# Patient Record
Sex: MALE | Race: WHITE | NOT HISPANIC OR LATINO | Employment: FULL TIME | URBAN - METROPOLITAN AREA
[De-identification: names, ages, dates, MRNs, and addresses within clinical notes are randomized per-mention and may not be internally consistent; named-entity substitution may affect disease eponyms.]

---

## 2017-01-11 ENCOUNTER — TRANSCRIBE ORDERS (OUTPATIENT)
Dept: ADMINISTRATIVE | Facility: HOSPITAL | Age: 32
End: 2017-01-11

## 2017-01-11 DIAGNOSIS — S83.231A COMPLEX TEAR OF MEDIAL MENISCUS OF RIGHT KNEE AS CURRENT INJURY, INITIAL ENCOUNTER: Primary | ICD-10-CM

## 2017-01-11 DIAGNOSIS — M25.561 RIGHT KNEE PAIN, UNSPECIFIED CHRONICITY: ICD-10-CM

## 2017-01-17 ENCOUNTER — TRANSCRIBE ORDERS (OUTPATIENT)
Dept: ADMINISTRATIVE | Facility: HOSPITAL | Age: 32
End: 2017-01-17

## 2017-01-17 ENCOUNTER — HOSPITAL ENCOUNTER (OUTPATIENT)
Dept: RADIOLOGY | Facility: HOSPITAL | Age: 32
Discharge: HOME | End: 2017-01-17
Payer: COMMERCIAL

## 2017-01-17 ENCOUNTER — HOSPITAL ENCOUNTER (OUTPATIENT)
Dept: MRI IMAGING | Facility: HOSPITAL | Age: 32
Discharge: HOME | End: 2017-01-17
Payer: COMMERCIAL

## 2017-01-17 DIAGNOSIS — M25.561 RIGHT KNEE PAIN, UNSPECIFIED CHRONICITY: ICD-10-CM

## 2017-01-17 DIAGNOSIS — M25.561 RIGHT KNEE PAIN, UNSPECIFIED CHRONICITY: Primary | ICD-10-CM

## 2017-01-17 DIAGNOSIS — S83.231A COMPLEX TEAR OF MEDIAL MENISCUS OF RIGHT KNEE AS CURRENT INJURY, INITIAL ENCOUNTER: ICD-10-CM

## 2017-01-17 PROCEDURE — 73721 MRI JNT OF LWR EXTRE W/O DYE: CPT

## 2017-01-17 PROCEDURE — 70030 X-RAY EYE FOR FOREIGN BODY: CPT

## 2017-01-26 ENCOUNTER — GENERIC CONVERSION - ENCOUNTER (OUTPATIENT)
Dept: OTHER | Facility: OTHER | Age: 32
End: 2017-01-26

## 2017-03-02 ENCOUNTER — GENERIC CONVERSION - ENCOUNTER (OUTPATIENT)
Dept: OTHER | Facility: OTHER | Age: 32
End: 2017-03-02

## 2017-05-25 ENCOUNTER — ALLSCRIPTS OFFICE VISIT (OUTPATIENT)
Dept: OTHER | Facility: OTHER | Age: 32
End: 2017-05-25

## 2017-07-19 ENCOUNTER — TRANSCRIBE ORDERS (OUTPATIENT)
Dept: ADMINISTRATIVE | Facility: HOSPITAL | Age: 32
End: 2017-07-19

## 2017-07-19 ENCOUNTER — ALLSCRIPTS OFFICE VISIT (OUTPATIENT)
Dept: OTHER | Facility: OTHER | Age: 32
End: 2017-07-19

## 2017-07-19 DIAGNOSIS — I10 ESSENTIAL (PRIMARY) HYPERTENSION: ICD-10-CM

## 2017-07-19 DIAGNOSIS — R05.9 COUGH: ICD-10-CM

## 2017-07-19 DIAGNOSIS — J98.59 MEDIASTINAL MASS: Primary | ICD-10-CM

## 2017-07-20 ENCOUNTER — GENERIC CONVERSION - ENCOUNTER (OUTPATIENT)
Dept: OTHER | Facility: OTHER | Age: 32
End: 2017-07-20

## 2017-07-25 ENCOUNTER — HOSPITAL ENCOUNTER (OUTPATIENT)
Dept: CT IMAGING | Facility: HOSPITAL | Age: 32
Discharge: HOME | End: 2017-07-25
Attending: INTERNAL MEDICINE
Payer: COMMERCIAL

## 2017-07-25 DIAGNOSIS — R05.9 COUGH: ICD-10-CM

## 2017-07-25 PROCEDURE — 71260 CT THORAX DX C+: CPT

## 2017-07-25 RX ADMIN — IOHEXOL 85 ML: 350 INJECTION, SOLUTION INTRAVENOUS at 17:48

## 2017-07-29 ENCOUNTER — APPOINTMENT (OUTPATIENT)
Dept: LAB | Facility: HOSPITAL | Age: 32
End: 2017-07-29
Attending: INTERNAL MEDICINE
Payer: COMMERCIAL

## 2017-07-29 ENCOUNTER — TRANSCRIBE ORDERS (OUTPATIENT)
Dept: ADMINISTRATIVE | Facility: HOSPITAL | Age: 32
End: 2017-07-29

## 2017-07-29 DIAGNOSIS — I10 ESSENTIAL HYPERTENSION, MALIGNANT: ICD-10-CM

## 2017-07-29 DIAGNOSIS — R59.0 BILATERAL HILAR ADENOPATHY SYNDROME: ICD-10-CM

## 2017-07-29 DIAGNOSIS — R59.0 BILATERAL HILAR ADENOPATHY SYNDROME: Primary | ICD-10-CM

## 2017-07-29 LAB
ALBUMIN SERPL BCP-MCNC: 3.8 G/DL (ref 3.5–5)
ALP SERPL-CCNC: 70 U/L (ref 46–116)
ALT SERPL W P-5'-P-CCNC: 50 U/L (ref 12–78)
ANION GAP SERPL CALCULATED.3IONS-SCNC: 10 MMOL/L (ref 4–13)
AST SERPL W P-5'-P-CCNC: 18 U/L (ref 5–45)
BASOPHILS # BLD AUTO: 0.01 THOUSANDS/ΜL (ref 0–0.1)
BASOPHILS NFR BLD AUTO: 0 % (ref 0–1)
BILIRUB SERPL-MCNC: 0.8 MG/DL (ref 0.2–1)
BUN SERPL-MCNC: 22 MG/DL (ref 5–25)
CALCIUM SERPL-MCNC: 8.9 MG/DL (ref 8.3–10.1)
CHLORIDE SERPL-SCNC: 102 MMOL/L (ref 100–108)
CHOLEST SERPL-MCNC: 181 MG/DL (ref 50–200)
CO2 SERPL-SCNC: 29 MMOL/L (ref 21–32)
CREAT SERPL-MCNC: 1.09 MG/DL (ref 0.6–1.3)
EOSINOPHIL # BLD AUTO: 0.13 THOUSAND/ΜL (ref 0–0.61)
EOSINOPHIL NFR BLD AUTO: 3 % (ref 0–6)
ERYTHROCYTE [DISTWIDTH] IN BLOOD BY AUTOMATED COUNT: 13.8 % (ref 11.6–15.1)
GFR SERPL CREATININE-BSD FRML MDRD: 89 ML/MIN/1.73SQ M
GLUCOSE P FAST SERPL-MCNC: 131 MG/DL (ref 65–99)
HCT VFR BLD AUTO: 48.6 % (ref 36.5–49.3)
HDLC SERPL-MCNC: 33 MG/DL (ref 40–60)
HGB BLD-MCNC: 16.4 G/DL (ref 12–17)
LDH SERPL-CCNC: 182 U/L (ref 81–234)
LDLC SERPL CALC-MCNC: 111 MG/DL (ref 0–100)
LYMPHOCYTES # BLD AUTO: 1.44 THOUSANDS/ΜL (ref 0.6–4.47)
LYMPHOCYTES NFR BLD AUTO: 30 % (ref 14–44)
MCH RBC QN AUTO: 27.1 PG (ref 26.8–34.3)
MCHC RBC AUTO-ENTMCNC: 33.7 G/DL (ref 31.4–37.4)
MCV RBC AUTO: 80 FL (ref 82–98)
MONOCYTES # BLD AUTO: 0.41 THOUSAND/ΜL (ref 0.17–1.22)
MONOCYTES NFR BLD AUTO: 9 % (ref 4–12)
NEUTROPHILS # BLD AUTO: 2.83 THOUSANDS/ΜL (ref 1.85–7.62)
NEUTS SEG NFR BLD AUTO: 58 % (ref 43–75)
PLATELET # BLD AUTO: 186 THOUSANDS/UL (ref 149–390)
PMV BLD AUTO: 10.7 FL (ref 8.9–12.7)
POTASSIUM SERPL-SCNC: 4.1 MMOL/L (ref 3.5–5.3)
PROT SERPL-MCNC: 7.2 G/DL (ref 6.4–8.2)
RBC # BLD AUTO: 6.06 MILLION/UL (ref 3.88–5.62)
SODIUM SERPL-SCNC: 141 MMOL/L (ref 136–145)
TRIGL SERPL-MCNC: 184 MG/DL
TSH SERPL DL<=0.05 MIU/L-ACNC: 1.35 UIU/ML (ref 0.36–3.74)
WBC # BLD AUTO: 4.82 THOUSAND/UL (ref 4.31–10.16)

## 2017-07-29 PROCEDURE — 83615 LACTATE (LD) (LDH) ENZYME: CPT

## 2017-07-29 PROCEDURE — 82164 ANGIOTENSIN I ENZYME TEST: CPT

## 2017-07-29 PROCEDURE — 84443 ASSAY THYROID STIM HORMONE: CPT

## 2017-07-29 PROCEDURE — 80053 COMPREHEN METABOLIC PANEL: CPT

## 2017-07-29 PROCEDURE — 80061 LIPID PANEL: CPT

## 2017-07-29 PROCEDURE — 85025 COMPLETE CBC W/AUTO DIFF WBC: CPT

## 2017-07-29 PROCEDURE — 36415 COLL VENOUS BLD VENIPUNCTURE: CPT

## 2017-07-31 LAB — ACE SERPL-CCNC: 33 U/L (ref 14–82)

## 2017-08-02 ENCOUNTER — GENERIC CONVERSION - ENCOUNTER (OUTPATIENT)
Dept: OTHER | Facility: OTHER | Age: 32
End: 2017-08-02

## 2017-08-09 ENCOUNTER — ALLSCRIPTS OFFICE VISIT (OUTPATIENT)
Dept: OTHER | Facility: OTHER | Age: 32
End: 2017-08-09

## 2017-08-23 ENCOUNTER — ANESTHESIA EVENT (OUTPATIENT)
Dept: PERIOP | Facility: HOSPITAL | Age: 32
End: 2017-08-23
Payer: COMMERCIAL

## 2017-08-23 ENCOUNTER — ANESTHESIA (OUTPATIENT)
Dept: PERIOP | Facility: HOSPITAL | Age: 32
End: 2017-08-23
Payer: COMMERCIAL

## 2017-08-23 ENCOUNTER — HOSPITAL ENCOUNTER (OUTPATIENT)
Facility: HOSPITAL | Age: 32
Setting detail: OUTPATIENT SURGERY
Discharge: HOME | End: 2017-08-23
Attending: INTERNAL MEDICINE | Admitting: INTERNAL MEDICINE
Payer: COMMERCIAL

## 2017-08-23 VITALS
WEIGHT: 225 LBS | TEMPERATURE: 97.8 F | SYSTOLIC BLOOD PRESSURE: 134 MMHG | HEIGHT: 74 IN | HEART RATE: 70 BPM | RESPIRATION RATE: 16 BRPM | OXYGEN SATURATION: 95 % | DIASTOLIC BLOOD PRESSURE: 68 MMHG | BODY MASS INDEX: 28.88 KG/M2

## 2017-08-23 DIAGNOSIS — R93.89 ABNORMAL CHEST CT: ICD-10-CM

## 2017-08-23 PROCEDURE — 88305 TISSUE EXAM BY PATHOLOGIST: CPT | Performed by: INTERNAL MEDICINE

## 2017-08-23 PROCEDURE — 87102 FUNGUS ISOLATION CULTURE: CPT | Performed by: INTERNAL MEDICINE

## 2017-08-23 PROCEDURE — 88173 CYTOPATH EVAL FNA REPORT: CPT | Performed by: INTERNAL MEDICINE

## 2017-08-23 PROCEDURE — 87205 SMEAR GRAM STAIN: CPT | Performed by: INTERNAL MEDICINE

## 2017-08-23 PROCEDURE — 88112 CYTOPATH CELL ENHANCE TECH: CPT | Performed by: INTERNAL MEDICINE

## 2017-08-23 PROCEDURE — 88342 IMHCHEM/IMCYTCHM 1ST ANTB: CPT | Performed by: INTERNAL MEDICINE

## 2017-08-23 PROCEDURE — 87176 TISSUE HOMOGENIZATION CULTR: CPT | Performed by: INTERNAL MEDICINE

## 2017-08-23 PROCEDURE — 87107 FUNGI IDENTIFICATION MOLD: CPT | Performed by: INTERNAL MEDICINE

## 2017-08-23 PROCEDURE — 88312 SPECIAL STAINS GROUP 1: CPT | Performed by: INTERNAL MEDICINE

## 2017-08-23 PROCEDURE — 87070 CULTURE OTHR SPECIMN AEROBIC: CPT | Performed by: INTERNAL MEDICINE

## 2017-08-23 PROCEDURE — 88184 FLOWCYTOMETRY/ TC 1 MARKER: CPT | Performed by: INTERNAL MEDICINE

## 2017-08-23 PROCEDURE — 87116 MYCOBACTERIA CULTURE: CPT | Performed by: INTERNAL MEDICINE

## 2017-08-23 PROCEDURE — 88172 CYTP DX EVAL FNA 1ST EA SITE: CPT | Performed by: INTERNAL MEDICINE

## 2017-08-23 PROCEDURE — 88185 FLOWCYTOMETRY/TC ADD-ON: CPT

## 2017-08-23 PROCEDURE — 87206 SMEAR FLUORESCENT/ACID STAI: CPT | Performed by: INTERNAL MEDICINE

## 2017-08-23 RX ORDER — ONDANSETRON 2 MG/ML
INJECTION INTRAMUSCULAR; INTRAVENOUS AS NEEDED
Status: DISCONTINUED | OUTPATIENT
Start: 2017-08-23 | End: 2017-08-23 | Stop reason: SURG

## 2017-08-23 RX ORDER — SODIUM CHLORIDE 9 MG/ML
125 INJECTION, SOLUTION INTRAVENOUS CONTINUOUS
Status: DISCONTINUED | OUTPATIENT
Start: 2017-08-23 | End: 2017-08-23 | Stop reason: HOSPADM

## 2017-08-23 RX ORDER — LIDOCAINE HYDROCHLORIDE 10 MG/ML
INJECTION, SOLUTION EPIDURAL; INFILTRATION; INTRACAUDAL; PERINEURAL
Status: DISCONTINUED
Start: 2017-08-23 | End: 2017-08-23 | Stop reason: HOSPADM

## 2017-08-23 RX ORDER — PROPOFOL 10 MG/ML
INJECTION, EMULSION INTRAVENOUS AS NEEDED
Status: DISCONTINUED | OUTPATIENT
Start: 2017-08-23 | End: 2017-08-23 | Stop reason: SURG

## 2017-08-23 RX ORDER — FENTANYL CITRATE/PF 50 MCG/ML
25 SYRINGE (ML) INJECTION
Status: DISCONTINUED | OUTPATIENT
Start: 2017-08-23 | End: 2017-08-23 | Stop reason: HOSPADM

## 2017-08-23 RX ORDER — ONDANSETRON 2 MG/ML
4 INJECTION INTRAMUSCULAR; INTRAVENOUS ONCE AS NEEDED
Status: DISCONTINUED | OUTPATIENT
Start: 2017-08-23 | End: 2017-08-23 | Stop reason: HOSPADM

## 2017-08-23 RX ADMIN — PROPOFOL 50 MG: 10 INJECTION, EMULSION INTRAVENOUS at 13:39

## 2017-08-23 RX ADMIN — PROPOFOL 200 MG: 10 INJECTION, EMULSION INTRAVENOUS at 13:35

## 2017-08-23 RX ADMIN — SODIUM CHLORIDE: 0.9 INJECTION, SOLUTION INTRAVENOUS at 13:30

## 2017-08-23 RX ADMIN — ONDANSETRON 4 MG: 2 INJECTION INTRAMUSCULAR; INTRAVENOUS at 14:31

## 2017-08-25 LAB — SCAN RESULT: NORMAL

## 2017-08-26 LAB
BACTERIA BRONCH AEROBE CULT: NORMAL
BACTERIA TISS AEROBE CULT: NORMAL
GRAM STN SPEC: NORMAL
GRAM STN SPEC: NORMAL

## 2017-08-30 ENCOUNTER — GENERIC CONVERSION - ENCOUNTER (OUTPATIENT)
Dept: OTHER | Facility: OTHER | Age: 32
End: 2017-08-30

## 2017-09-02 ENCOUNTER — GENERIC CONVERSION - ENCOUNTER (OUTPATIENT)
Dept: OTHER | Facility: OTHER | Age: 32
End: 2017-09-02

## 2017-09-02 ENCOUNTER — APPOINTMENT (OUTPATIENT)
Dept: LAB | Facility: HOSPITAL | Age: 32
End: 2017-09-02
Attending: INTERNAL MEDICINE
Payer: COMMERCIAL

## 2017-09-02 ENCOUNTER — TRANSCRIBE ORDERS (OUTPATIENT)
Dept: ADMINISTRATIVE | Facility: HOSPITAL | Age: 32
End: 2017-09-02

## 2017-09-02 DIAGNOSIS — I10 ESSENTIAL (PRIMARY) HYPERTENSION: ICD-10-CM

## 2017-09-02 LAB
ANION GAP SERPL CALCULATED.3IONS-SCNC: 6 MMOL/L (ref 4–13)
BUN SERPL-MCNC: 25 MG/DL (ref 5–25)
CALCIUM SERPL-MCNC: 9.3 MG/DL (ref 8.3–10.1)
CHLORIDE SERPL-SCNC: 103 MMOL/L (ref 100–108)
CO2 SERPL-SCNC: 29 MMOL/L (ref 21–32)
CREAT SERPL-MCNC: 1.13 MG/DL (ref 0.6–1.3)
EST. AVERAGE GLUCOSE BLD GHB EST-MCNC: 146 MG/DL
GFR SERPL CREATININE-BSD FRML MDRD: 85 ML/MIN/1.73SQ M
GLUCOSE P FAST SERPL-MCNC: 129 MG/DL (ref 65–99)
HBA1C MFR BLD: 6.7 % (ref 4.2–6.3)
POTASSIUM SERPL-SCNC: 4.9 MMOL/L (ref 3.5–5.3)
SODIUM SERPL-SCNC: 138 MMOL/L (ref 136–145)

## 2017-09-02 PROCEDURE — 36415 COLL VENOUS BLD VENIPUNCTURE: CPT

## 2017-09-02 PROCEDURE — 83036 HEMOGLOBIN GLYCOSYLATED A1C: CPT

## 2017-09-02 PROCEDURE — 80048 BASIC METABOLIC PNL TOTAL CA: CPT

## 2017-09-19 ENCOUNTER — ALLSCRIPTS OFFICE VISIT (OUTPATIENT)
Dept: OTHER | Facility: OTHER | Age: 32
End: 2017-09-19

## 2017-09-25 LAB — FUNGUS SPEC CULT: NORMAL

## 2017-09-26 ENCOUNTER — TRANSCRIBE ORDERS (OUTPATIENT)
Dept: ADMINISTRATIVE | Facility: HOSPITAL | Age: 32
End: 2017-09-26

## 2017-09-26 ENCOUNTER — ALLSCRIPTS OFFICE VISIT (OUTPATIENT)
Dept: OTHER | Facility: OTHER | Age: 32
End: 2017-09-26

## 2017-09-26 DIAGNOSIS — D86.0 PULMONARY SARCOIDOSIS (HCC): Primary | ICD-10-CM

## 2017-10-10 LAB — LABORATORY COMMENT REPORT: NORMAL

## 2017-10-11 LAB
MYCOBACTERIUM SPEC CULT: NORMAL
MYCOBACTERIUM SPEC CULT: NORMAL
RHODAMINE-AURAMINE STN SPEC: NORMAL
RHODAMINE-AURAMINE STN SPEC: NORMAL

## 2017-10-24 ENCOUNTER — GENERIC CONVERSION - ENCOUNTER (OUTPATIENT)
Dept: OTHER | Facility: OTHER | Age: 32
End: 2017-10-24

## 2017-10-25 ENCOUNTER — GENERIC CONVERSION - ENCOUNTER (OUTPATIENT)
Dept: OTHER | Facility: OTHER | Age: 32
End: 2017-10-25

## 2017-11-07 ENCOUNTER — GENERIC CONVERSION - ENCOUNTER (OUTPATIENT)
Dept: OTHER | Facility: OTHER | Age: 32
End: 2017-11-07

## 2017-11-07 ENCOUNTER — CONVERSION ENCOUNTER (OUTPATIENT)
Dept: OTHER | Facility: OTHER | Age: 32
End: 2017-11-07

## 2017-11-07 LAB
FUNGUS SPEC CULT: ABNORMAL
LEFT EYE DIABETIC RETINOPATHY: NORMAL
RIGHT EYE DIABETIC RETINOPATHY: NORMAL

## 2017-11-08 ENCOUNTER — GENERIC CONVERSION - ENCOUNTER (OUTPATIENT)
Dept: OTHER | Facility: OTHER | Age: 32
End: 2017-11-08

## 2017-12-01 DIAGNOSIS — D86.0 SARCOIDOSIS OF LUNG (HCC): ICD-10-CM

## 2017-12-04 ENCOUNTER — HOSPITAL ENCOUNTER (OUTPATIENT)
Dept: PULMONOLOGY | Facility: HOSPITAL | Age: 32
Discharge: HOME | End: 2017-12-04
Attending: INTERNAL MEDICINE
Payer: COMMERCIAL

## 2017-12-04 DIAGNOSIS — D86.0 PULMONARY SARCOIDOSIS (HCC): ICD-10-CM

## 2017-12-04 DIAGNOSIS — E11.9 TYPE 2 DIABETES MELLITUS WITHOUT COMPLICATIONS (HCC): ICD-10-CM

## 2017-12-05 ENCOUNTER — HOSPITAL ENCOUNTER (OUTPATIENT)
Dept: PULMONOLOGY | Facility: HOSPITAL | Age: 32
Discharge: HOME | End: 2017-12-05
Attending: INTERNAL MEDICINE
Payer: COMMERCIAL

## 2017-12-05 ENCOUNTER — GENERIC CONVERSION - ENCOUNTER (OUTPATIENT)
Dept: PULMONOLOGY | Facility: HOSPITAL | Age: 32
End: 2017-12-05

## 2017-12-05 ENCOUNTER — HOSPITAL ENCOUNTER (OUTPATIENT)
Dept: CT IMAGING | Facility: HOSPITAL | Age: 32
Discharge: HOME | End: 2017-12-05
Attending: INTERNAL MEDICINE
Payer: COMMERCIAL

## 2017-12-05 DIAGNOSIS — D86.0 SARCOIDOSIS OF LUNG (HCC): ICD-10-CM

## 2017-12-05 PROCEDURE — 94060 EVALUATION OF WHEEZING: CPT

## 2017-12-05 PROCEDURE — 94726 PLETHYSMOGRAPHY LUNG VOLUMES: CPT

## 2017-12-05 PROCEDURE — 94729 DIFFUSING CAPACITY: CPT

## 2017-12-05 PROCEDURE — 94760 N-INVAS EAR/PLS OXIMETRY 1: CPT

## 2017-12-05 PROCEDURE — 71260 CT THORAX DX C+: CPT

## 2017-12-05 RX ORDER — ALBUTEROL SULFATE 2.5 MG/3ML
2.5 SOLUTION RESPIRATORY (INHALATION) EVERY 6 HOURS PRN
Status: DISCONTINUED | OUTPATIENT
Start: 2017-12-05 | End: 2017-12-09 | Stop reason: HOSPADM

## 2017-12-05 RX ADMIN — IOHEXOL 85 ML: 350 INJECTION, SOLUTION INTRAVENOUS at 19:32

## 2017-12-08 ENCOUNTER — ALLSCRIPTS OFFICE VISIT (OUTPATIENT)
Dept: OTHER | Facility: OTHER | Age: 32
End: 2017-12-08

## 2017-12-09 NOTE — PROGRESS NOTES
Assessment    1  Sarcoidosis of lung (135,517 8) (D86 0)    Plan  Sarcoidosis of lung    · Follow-up visit in 1 year Evaluation and Treatment  Follow-up  Status: Hold For -Scheduling  Requested for: [id number]   Ordered; Sarcoidosis of lung; Ordered By: Betty Conway Performed:  Due: 06BDS7582    Results/Data  PFT Results v2:     Spirometry: Forced vital capacity: 6 25L-- and-- 102% Predicted Values  Forced expiratory volume in one second: 4 72L-- and-- 96% Predicted Value  FEV1/FVC ratio is 75  Post Bronchodilator Spirometry:   Lung Volumes: Total lung capacity : 10 49L-- and-- 135% Predicted Values  RV: 215% Predicted Values  DLCO:  DLCO 109% Predicted Values  PFT Interpretation:  December 5, 2017: Normal spirometry, lung capacity and diffusion capacity  Elevation in residual volume may represent air trapping  CT CHEST W CONTRAST 22XLN3685 05:51PM Betty Conway     TW Order Number: [id number]  Performing Comments: [facility] DEC 5TH  700PM  NO PREP   - Patient Instructions: To schedule this appointment, please contact Central Scheduling at [phone]  Test Name Result Flag Reference   CT CHEST W CONTRAST (Report)       CT CHEST WITH IV CONTRAST   INDICATION: D86 0: Sarcoidosis of lung (this clinical history is taken directly from the electronic ordering system)  COMPARISON: July 25, 2017  TECHNIQUE: CT examination of the chest was performed  Reformatted images were created in axial, sagittal, and coronal planes  Radiation dose length product (DLP) for this visit: 355 mGy-cm   This examination, like all CT scans performed in the [facility], was performed utilizing techniques to minimize radiation dose exposure, including the use of iterative   reconstruction and automated exposure control  IV Contrast: 85 mL of iohexol (OMNIPAQUE)      FINDINGS:   LUNGS: Previously noted patchy area of groundglass density in the left suprahilar lung has resolved   Lungs are clear  No CT evidence of interstitial lung disease  PLEURA: Unremarkable  HEART/GREAT VESSELS: Unremarkable for patient's age  MEDIASTINUM AND MELLY: Marked interval decrease in the size of mediastinal and hilar lymph nodes  Borderline and minimally enlarged residual nodes are identified and specifically there is a 14 x 11 mm lower right hilar node on image 34 of series 2,   decreased from 1 9 x 1 8 cm on the previous examination  A subcarinal node measuring 1 5 x 1 5 cm is decreased from 3 9 x 2 2 cm on prior examination  CHEST WALL AND LOWER NECK: Unremarkable  VISUALIZED STRUCTURES IN THE UPPER ABDOMEN: Spleen no longer appears enlarged  No other clinically significant abnormality identified within the visualized upper abdomen  OSSEOUS STRUCTURES: No acute fracture  No destructive osseous lesion  IMPRESSION:   Marked interval decrease in lymphadenopathy since previous examination  Interval resolution of patchy area of groundglass parenchymal density previously noted in the suprahilar left lung  Spleen no longer appears enlarged  Workstation performed: [id number]   Signed by:  Lydia Canela MD  12/6/17     (1) HEMOGLOBIN A1C 07Sej0626 08:16AM [employer] Order Number: [de-identified]     Test Name Result Flag Reference   HEMOGLOBIN A1C 6 7 % H 4 2-6 3   EST  AVG   GLUCOSE 146 mg/dl       (1) TISSUE EXAM [id number] 02:50PM Gila Pac     Test Name Result Flag Reference   LAB AP CASE REPORT (Report)       Surgical Pathology Report             Case: [id number]                  Authorizing Provider: Cain Atkinson DO     Collected:      08/23/2017 1450       Ordering Location:   [facility]    Received:      08/23/2017 [facility]                           Pathologist:      Megan Damon MD                               Specimens:  A) - Lung, Left second mick endobronchial biopsy                          B) - Lung, Main mick endobronchial biopsy   LAB AP FINAL DIAGNOSIS (Report)       A  Lung, Left second mick endobronchial biopsy: -Benign respiratory mucosa with mild acute & chronic inflammation and few  non- necrotizing granulomas  -No fungal organisms seen on PAS stain  -No mycobacterial organisms identified on special stains (AFB)  Controls  reacted appropriately -No evidence of vasculitis, necrosis malignancy seen  Comment The differential diagnosis is vast and includes among others infectious  disease (e g , mycobacterial fungal, other), hypersensitivity pneumonitis,  vasculitis syndromes, local Henri granulomatosis and sarcoidosis  Clinical correlation and further investigations (e g  angiotensin  converting enzyme (ACE), ANCA, renal function tests) is recommended  B  Lung, Main mick endobronchial biopsy: -Benign respiratory mucosa with mild chronic inflammation and thickened  smooth muscle fragments -No evidence of malignancy seen  -Multiple deeper levels examined  Electronically signed by Herminia Gill MD on 8/28/2017 at 1:35 PM   LAB AP NOTE (Report)       -Pankeratin stain MCK is used in evaluation and highlighted surface  bronchial epithelium  Stromal cells are negative for staining  Appropriate  positive and negative controls reacted appropriately Intradepartmental consultation is in agreement Interpretation performed at [facility], [facility] A copy of this report was faxed to Dr Carla Sheridan  [facility] office on8/28/2017 at  1:30 PM   LAB AP SURGICAL ADDITIONAL INFORMATION (Report)       All controls performed with the immunohistochemical stains reported above  reacted appropriately  These tests were developed and their performance  characteristics determined by [facility] or  [facility]  They may not be cleared or approved by the [place]  Food and Drug Administration  The FDA has determined that such clearance  or approval is not necessary   These tests are used for clinical purposes  They should not be regarded as investigational or for research  This  laboratory has been approved by [place] 88, designated as a high-complexity  laboratory and is qualified to perform these tests  LAB AP GROSS DESCRIPTION (Report)       A  The specimen is received in formalin, labeled with the patient's name  and hospital number, and is designated left second mick endobronchial  biopsy  The specimen consists of multiple tan red soft tissue and  hemorrhagic fragments measuring in aggregate 0 3 x 0 2 x 0 1 cm  Entirely  submitted  One cassette  B  The specimen is received in formalin, labeled with the patient's name  and hospital number, and is designated main mick endobronchial  biopsy  The specimen consists of multiple tan red soft tissue and  hemorrhagic fragments measuring in aggregate 0 3 x 0 3 x 0 1 cm  Entirely  submitted  One cassette  Note: The estimated total formalin fixation time based upon information  provided by the submitting clinician and the standard processing schedule  is 23 25 hours  MAC       Discussion/Summary  Discussion Summary: At this point the patient is doing quite well  CT scan and clinical symptoms have significantly improved  does not warrant any therapy for sarcoidosis at this time  has gotten a flu shot this yeardo not believe that he needs a pneumovax  I will see him back in 1 year or sooner if pulmonary complications arise  Counseling Documentation With Imm: total time of encounter was 25 minutes-- and-- 15 minutes was spent counseling  Goals and Barriers: The patient has the current Goals: Continue with good pulmonary health  The patent has the current Barriers: None  Patient's Capacity to Self-Care: Patient is able to Self-Care  Medication SE Review and Pt Understands Tx: The treatment plan was reviewed with the patient/guardian  The patient/guardian understands and agrees with the treatment plan      Active Problems    1   Benign essential hypertension (401 1) (I10)   2  Chronic cough (786 2) (R05)   3  Denied: History of Mental health disorder   4  No advance directives (V49 89) (Z78 9)   5  Sarcoidosis of lung (135,517 8) (D86 0)   6  Denied: History of Substance abuse   7  Type 2 diabetes mellitus without complication, without long-term current use of insulin (250 00) (E11 9)    Chief Complaint  Chief Complaint Chronic Condition [facility]: Patient is here today for follow up of chronic conditions described in HPI  History of Present Illness  HPI: Overall the patient is doing quite well  denies any shortness of breath at rest or with exertion  He denies any chest pain or wheezing  His cough has significantly improved  He has no fevers or chills  He has no further night sweats  He has lost some weight and noticed that he is sleeping better and thinks that he is no longer snoring  He is back to âhis baseline â  has not had any medications such as prednisone or inhalers to treat sarcoidosis  has no rashes or joint pains  Review of Systems  Complete-Male Pulm:  Constitutional: recent weight loss, but-- no fever-- and-- not feeling poorly  ENT: no nasal discharge  Cardiovascular: no chest pain-- and-- no extremity edema  Respiratory: as noted in HPI  Integumentary: no rashes  Past Medical History  1  Denied: History of Mental health disorder   2  Denied: History of Substance abuse    Surgical History  1  History of Bronchoscopy (Diagnostic) Using Endobronchial Ultrasound Guidance   2  History of Incision Of The Tendon Sheath At The Radial Styloid  Surgical History Reviewed: The surgical history was reviewed and updated today  Family History  Mother    1  Family history of diabetes mellitus (V18 0) (Z83 3)   2  No family history of mental disorder   3  Denied: Family history of Substance abuse in family  Father    4  Family history of CABG   5  Family history of cardiac disorder (V17 49) (Z82 49)   6   Family history of diabetes mellitus (V18 0) (Z83 3)   7  No family history of mental disorder   8  Denied: Family history of Substance abuse in family  Maternal Grandmother    5  Family history of Bone cancer  Maternal Grandfather    10  Family history of lung cancer (V16 1) (Z80 1)  Family History Reviewed: The family history was reviewed and updated today  Social History     · Dental care, occasionally   · Full-time employment   · self emplyed    · Lives with spouse   · Living Situation: Supportive and safe   · Never smoker   · No advance directives (V49 89) (A65 4)   · No illicit drug use   · Occasional alcohol use  Social History Reviewed: The social history was reviewed and updated today  The social history was reviewed and is unchanged  Current Meds   1  Multi Vitamin Daily Oral Tablet; TAKE 1 TABLET DAILY; Therapy: 93UIO5607 to (Evaluate:21Nov2017) Recorded   2  Omega-3-acid Ethyl Esters 1 GM Oral Capsule; TAKE 2 CAPSULES EVERY DAY; Therapy: 00LFL2523 to (Manasa Maple) Recorded  Medication List Reviewed: The medication list was reviewed and updated today  Allergies  1  No Known Drug Allergies    Vitals  Vital Signs    Recorded: 18EJN5860 08:16AM   Temperature 98 F   Heart Rate 62   Respiration 16   Systolic 918   Diastolic 80   Height 6 ft 2 in   Weight 200 lb    BMI Calculated 25 68   BSA Calculated 2 17   O2 Saturation 98, RA       Physical Exam   Constitutional  General appearance: No acute distress, well appearing and well nourished  Ears, Nose, Mouth, and Throat  Nasal mucosa, septum, and turbinates: Normal without edema or erythema  Lips, teeth, and gums: Normal, good dentition  Oropharynx: Normal with no erythema, edema, exudate or lesions  Neck  Neck: Supple, symmetric, trachea midline, no masses  Jugular veins: Normal    Pulmonary  Chest: Normal    Respiratory effort: No increased work of breathing or signs of respiratory distress     Auscultation of lungs: Clear to auscultation, no rales, no crackles, no wheezing  Cardiovascular  Auscultation of heart: Normal rate and rhythm, normal S1 and S2, no murmurs  Examination of extremities for edema and/or varicosities: Normal    Abdomen  Abdomen: Soft, non-tender  Lymphatic  Palpation of lymph nodes in neck: No lymphadenopathy  Musculoskeletal  Gait and station: Normal    Digits and nails: Normal without clubbing or cyanosis  Neurologic  Mental Status: Normal  Not confused, no evidence of dementia, good comprehension, good concentration  Skin  Skin and subcutaneous tissue: Limited exam shows no rash     Psychiatric  Orientation to person, place and time: Normal    Mood and affect: Normal        Signatures   Electronically signed by : Sheila Devine DO; Dec  8 2017  9:09AM EST                       (Author)

## 2018-01-09 NOTE — MISCELLANEOUS
Message  I called the patient with results of EBUS that show non- caseating granulomas consistent with sarcoidosis  No indication for systemic therapy  Will discuss further at next appointment      Signatures   Electronically signed by : Lj Ruffin DO; Aug 30 2017  4:33PM EST                       (Author)

## 2018-01-10 NOTE — RESULT NOTES
Verified Results  (1) CBC/PLT/DIFF [id number] 07:09AM Brianna Michaels     Test Name Result Flag Reference   WBC COUNT 4 82 Thousand/uL  4 31-10 16   RBC COUNT 6 06 Million/uL H 3 88-5 62   HEMOGLOBIN 16 4 g/dL  12 0-17 0   HEMATOCRIT 48 6 %  36 5-49 3   MCV 80 fL L 82-98   MCH 27 1 pg  26 8-34 3   MCHC 33 7 g/dL  31 4-37 4   RDW 13 8 %  11 6-15 1   MPV 10 7 fL  8 9-12 7   PLATELET COUNT 027 Thousands/uL  149-390   NEUTROPHILS RELATIVE PERCENT 58 %  43-75   LYMPHOCYTES RELATIVE PERCENT 30 %  14-44   MONOCYTES RELATIVE PERCENT 9 %  4-12   EOSINOPHILS RELATIVE PERCENT 3 %  0-6   BASOPHILS RELATIVE PERCENT 0 %  0-1   NEUTROPHILS ABSOLUTE COUNT 2 83 Thousands/? ??L  1 85-7 62   LYMPHOCYTES ABSOLUTE COUNT 1 44 Thousands/? ??L  0 60-4 47   MONOCYTES ABSOLUTE COUNT 0 41 Thousand/? ??L  0 17-1 22   EOSINOPHILS ABSOLUTE COUNT 0 13 Thousand/? ??L  0 00-0 61   BASOPHILS ABSOLUTE COUNT 0 01 Thousands/? ??L  0 00-0 10   This bloodwork is non-fasting  Please drink two glasses of water morning of  bloodwork  (1) LIPID PANEL, FASTING [id number] 07:09AM Brianna Michaels     Test Name Result Flag Reference   CHOLESTEROL 181 mg/dL     HDL,DIRECT 33 mg/dL L 40-60   Specimen collection should occur prior to Metamizole administration due to the potential for falsely depressed results  LDL CHOLESTEROL CALCULATED 111 mg/dL H 0-100   This is a fasting blood test  Water,black tea or black  coffee only after 9:00pm the night before test  Drink 2 glasses of water the morning of test         Triglyceride:         Normal              <150 mg/dl       Borderline High    150-199 mg/dl       High               200-499 mg/dl       Very High          >499 mg/dl  Cholesterol:         Desirable        <200 mg/dl      Borderline High  200-239 mg/dl      High             >239 mg/dl  HDL Cholesterol:        High    >59 mg/dL      Low     <41 mg/dL  LDL CALCULATED:    This screening LDL is a calculated result    It does not have the accuracy of the Direct Measured LDL in the monitoring of patients with hyperlipidemia and/or statin therapy  Direct Measure LDL (YGS309) must be ordered separately in these patients  TRIGLYCERIDES 184 mg/dL H <=150   Specimen collection should occur prior to N-Acetylcysteine or Metamizole administration due to the potential for falsely depressed results  (1) LD (LDH) [id number] 07:09AM Ava Nolasco     Test Name Result Flag Reference   LD (LDH) 182 U/L       (1) TSH WITH FT4 REFLEX [id number] 07:09AM Ava Constance     Test Name Result Flag Reference   TSH 1 354 uIU/mL  0 358-3 740   Patients undergoing fluorescein dye angiography may retain small amounts of fluorescein in the body for 48-72 hours post procedure  Samples containing fluorescein can produce falsely depressed TSH values  If the patient had this procedure,a specimen should be resubmitted post fluorescein clearance  (1) COMPREHENSIVE METABOLIC PANEL [id number] 02:75UV Ava Nolasco     Test Name Result Flag Reference   SODIUM 141 mmol/L  136-145   POTASSIUM 4 1 mmol/L  3 5-5 3   CHLORIDE 102 mmol/L  100-108   CARBON DIOXIDE 29 mmol/L  21-32   ANION GAP (CALC) 10 mmol/L  4-13   BLOOD UREA NITROGEN 22 mg/dL  5-25   CREATININE 1 09 mg/dL  0 60-1 30   Standardized to IDMS reference method   CALCIUM 8 9 mg/dL  8 3-10 1   BILI, TOTAL 0 80 mg/dL  0 20-1 00   ALK PHOSPHATAS 70 U/L     ALT (SGPT) 50 U/L  12-78   AST(SGOT) 18 U/L  5-45   ALBUMIN 3 8 g/dL  3 5-5 0   TOTAL PROTEIN 7 2 g/dL  6 4-8 2   eGFR 89 ml/min/1 73sq m     National Kidney Disease Education Program recommendations are as follows:  GFR calculation is accurate only with a steady state creatinine  Chronic Kidney disease less than 60 ml/min/1 73 sq  meters  Kidney failure less than 15 ml/min/1 73 sq  meters     GLUCOSE FASTING 131 mg/dL H 65-99     (1) ANGIOTENSIN CONVERSIN ENZYME [id number] 07:09AM Ava Constance     Test Name Result Flag Reference   ANGIOT CONV ENZ 33 U/L  14 - 82   Performed at:  [address] , [place], [place]  [id number]  : Olya Somers MD, Phone:  [phone]

## 2018-01-10 NOTE — RESULT NOTES
Verified Results  (1) BASIC METABOLIC PROFILE [id number] 08:16AM [email or web address] Order Number: [de-identified]     Test Name Result Flag Reference   SODIUM 138 mmol/L  136-145   POTASSIUM 4 9 mmol/L  3 5-5 3   CHLORIDE 103 mmol/L  100-108   CARBON DIOXIDE 29 mmol/L  21-32   ANION GAP (CALC) 6 mmol/L  4-13   BLOOD UREA NITROGEN 25 mg/dL  5-25   CREATININE 1 13 mg/dL  0 60-1 30   Standardized to IDMS reference method   CALCIUM 9 3 mg/dL  8 3-10 1   eGFR 85 ml/min/1 73sq m     National Kidney Disease Education Program recommendations are as follows:  GFR calculation is accurate only with a steady state creatinine  Chronic Kidney disease less than 60 ml/min/1 73 sq  meters  Kidney failure less than 15 ml/min/1 73 sq  meters  GLUCOSE FASTING 129 mg/dL H 65-99   Specimen collection should occur prior to Sulfasalazine administration due to the potential for falsely depressed results  Specimen collection should occur prior to Sulfapyridine administration due to the potential for falsely elevated results  (1) HEMOGLOBIN A1C 51Gel9375 08:16AM [email or web address] Order Number: [de-identified]     Test Name Result Flag Reference   HEMOGLOBIN A1C 6 7 % H 4 2-6 3   EST  AVG   GLUCOSE 146 mg/dl

## 2018-01-12 VITALS
BODY MASS INDEX: 31.06 KG/M2 | HEART RATE: 72 BPM | HEIGHT: 74 IN | RESPIRATION RATE: 16 BRPM | DIASTOLIC BLOOD PRESSURE: 88 MMHG | SYSTOLIC BLOOD PRESSURE: 152 MMHG | WEIGHT: 242 LBS

## 2018-01-12 NOTE — MISCELLANEOUS
Message   Date: 24 Oct 2017 1:07 PM EST, Recorded By: Malick Green   Calling For: Ning Santiago   Caller: marichuy, Spouse   Phone: [phone]   Reason: General Medical Question   pt's wife called because she received a denial letter from her insurance on services i believe that were already done to her   i asked maryjo and nikunj about this they gave me CBO"s (central billing office) # [phone] gave marichuy the # she calls they tell her they don't do those things call the Dr's office back again so she did, so we (maryjo and me) called the # ourselves we get someone that states her name is Josef Running and that they are aware and already working on this, i tell the patient this info the pt then calls herself and ask for this Josef Running they tell her no one by that name works there so she calls me back and tells me hey no one by that name there so i called myself and i get a sundeep whom states the name of the other girl was [employer] and gave me a different # to give to the PT's wife which is [phone] sundeep also states that someone is working on that appeal for the services  on Oct 25th i received the letter of medical necessity from Dr Lakia Herbert and sent it to Chelsie Orellana in acct  receivable and a copy to the patient       Signatures   Electronically signed by : Sylvia Kunz, ; Oct 25 2017 11:59AM EST                       (Author)

## 2018-01-14 VITALS
DIASTOLIC BLOOD PRESSURE: 80 MMHG | HEART RATE: 68 BPM | HEIGHT: 74 IN | SYSTOLIC BLOOD PRESSURE: 124 MMHG | WEIGHT: 217.5 LBS | BODY MASS INDEX: 27.91 KG/M2

## 2018-01-14 VITALS
HEART RATE: 74 BPM | DIASTOLIC BLOOD PRESSURE: 82 MMHG | RESPIRATION RATE: 18 BRPM | BODY MASS INDEX: 29.77 KG/M2 | SYSTOLIC BLOOD PRESSURE: 120 MMHG | OXYGEN SATURATION: 98 % | TEMPERATURE: 97.3 F | HEIGHT: 74 IN | WEIGHT: 232 LBS

## 2018-01-15 VITALS
OXYGEN SATURATION: 98 % | WEIGHT: 217 LBS | HEIGHT: 74 IN | TEMPERATURE: 98 F | DIASTOLIC BLOOD PRESSURE: 74 MMHG | HEART RATE: 70 BPM | BODY MASS INDEX: 27.85 KG/M2 | SYSTOLIC BLOOD PRESSURE: 112 MMHG | RESPIRATION RATE: 14 BRPM

## 2018-01-15 VITALS
BODY MASS INDEX: 31.96 KG/M2 | TEMPERATURE: 98.4 F | HEART RATE: 72 BPM | DIASTOLIC BLOOD PRESSURE: 88 MMHG | HEIGHT: 74 IN | WEIGHT: 249.03 LBS | SYSTOLIC BLOOD PRESSURE: 142 MMHG

## 2018-01-15 NOTE — MISCELLANEOUS
Message  made pt aware of ct chest results on the phone, will refer to DR Carlin Whitley for further evaluation of hilar/mediastinal lymphadenopathy that most likely causes pt' s cough  Pt denies fevers, wt loss, hemoptysis, rash or smoking  At times has sweats  Will get cbc w diff, ldh, ace level and will likely need bronchoscopy w biopsy  Plan  Mediastinal lymphadenopathy    · 1 - Jake Gann  (Pulmonary Disease) Co-Management  *  Status: Active   Requested for: 20YCE7958  Care Summary provided  : Yes   · (1) LD (LDH); Status:Active; Requested for:71Ckt7356;    · (LC) Angiotensin-Converting Enzyme; Status:Active; Requested for:30Jgf3108;    · (LC) CBC W/DIFFERENTIAL/PLATELET; Status:Active;  Requested for:52Ktk0387;     Signatures   Electronically signed by : SIMONA Singleton ; Jul 27 2017  2:10PM EST                       (Author)

## 2018-01-22 VITALS
TEMPERATURE: 98 F | HEART RATE: 62 BPM | HEIGHT: 74 IN | DIASTOLIC BLOOD PRESSURE: 80 MMHG | RESPIRATION RATE: 16 BRPM | WEIGHT: 200 LBS | BODY MASS INDEX: 25.67 KG/M2 | OXYGEN SATURATION: 98 % | SYSTOLIC BLOOD PRESSURE: 120 MMHG

## 2018-03-27 ENCOUNTER — OFFICE VISIT (OUTPATIENT)
Dept: FAMILY MEDICINE CLINIC | Facility: HOSPITAL | Age: 33
End: 2018-03-27
Payer: COMMERCIAL

## 2018-03-27 VITALS
TEMPERATURE: 98.9 F | DIASTOLIC BLOOD PRESSURE: 78 MMHG | WEIGHT: 208 LBS | SYSTOLIC BLOOD PRESSURE: 122 MMHG | HEART RATE: 88 BPM | BODY MASS INDEX: 26.69 KG/M2 | HEIGHT: 74 IN

## 2018-03-27 DIAGNOSIS — J01.00 ACUTE MAXILLARY SINUSITIS, RECURRENCE NOT SPECIFIED: Primary | ICD-10-CM

## 2018-03-27 DIAGNOSIS — E11.9 TYPE 2 DIABETES MELLITUS WITHOUT COMPLICATION, WITHOUT LONG-TERM CURRENT USE OF INSULIN (HCC): ICD-10-CM

## 2018-03-27 PROBLEM — D86.0 SARCOIDOSIS OF LUNG (HCC): Status: ACTIVE | Noted: 2017-09-26

## 2018-03-27 PROCEDURE — 1036F TOBACCO NON-USER: CPT | Performed by: INTERNAL MEDICINE

## 2018-03-27 PROCEDURE — 99214 OFFICE O/P EST MOD 30 MIN: CPT | Performed by: INTERNAL MEDICINE

## 2018-03-27 PROCEDURE — 3008F BODY MASS INDEX DOCD: CPT | Performed by: INTERNAL MEDICINE

## 2018-03-27 RX ORDER — AMOXICILLIN 875 MG/1
875 TABLET, COATED ORAL 2 TIMES DAILY
Qty: 14 TABLET | Refills: 0 | Status: SHIPPED | OUTPATIENT
Start: 2018-03-27 | End: 2018-04-03

## 2018-03-27 RX ORDER — CHLORAL HYDRATE 500 MG
2 CAPSULE ORAL DAILY
COMMUNITY
Start: 2017-05-25

## 2018-03-27 NOTE — PROGRESS NOTES
Assessment/Plan:    Type 2 diabetes mellitus without complication, without long-term current use of insulin ([facility] )  Is due for labs, is on diet       Diagnoses and all orders for this visit:    Acute maxillary sinusitis, recurrence not specified  -     amoxicillin (AMOXIL) 875 mg tablet; Take 1 tablet (875 mg total) by mouth 2 (two) times a day for 7 days    Type 2 diabetes mellitus without complication, without long-term current use of insulin ([facility])  -     CBC; Future  -     Comprehensive metabolic panel; Future  -     HEMOGLOBIN A1C W/ EAG ESTIMATION; Future  -     Microalbumin / creatinine urine ratio; Future  -     TSH, 3rd generation with T4 reflex; Future  -     Lipid panel; Future    Other orders  -     Multiple Vitamins-Minerals (MULTIVITAMIN ADULT PO); Take 1 tablet by mouth daily  -     Omega-3 1000 MG CAPS; Take 2 capsules by mouth daily          Subjective:      Patient ID: Casandra Grayson is a 34 y o  male  URI    This is a new problem  The current episode started in the past 7 days  The problem has been unchanged  The maximum temperature recorded prior to his arrival was 100 4 - 100 9 F  Associated symptoms include congestion, coughing, headaches and a sore throat  Pertinent negatives include no abdominal pain, chest pain, diarrhea, dysuria or vomiting  He has tried nothing for the symptoms  The following portions of the patient's history were reviewed and updated as appropriate: current medications, past family history, past medical history, past social history, past surgical history and problem list     Review of Systems   HENT: Positive for congestion and sore throat  Respiratory: Positive for cough  Cardiovascular: Negative for chest pain  Gastrointestinal: Negative for abdominal pain, diarrhea and vomiting  Genitourinary: Negative for dysuria  Neurological: Positive for headaches           Objective:    /78   Pulse 88   Temp 98 9 °F (37 2 °C) (Tympanic)   Ht 6' 2" (1 88 m)   Wt 94 3 kg (208 lb)   BMI 26 71 kg/m²      Physical Exam   Constitutional: He is oriented to person, place, and time  He appears well-developed  No distress  HENT:   Head: Normocephalic  Nose: Mucosal edema and rhinorrhea present  Mouth/Throat: No oropharyngeal exudate  Eyes: Conjunctivae are normal    Neck: Neck supple  Cardiovascular: Normal rate and regular rhythm  Pulmonary/Chest: Effort normal  No respiratory distress  He has no wheezes  He has no rales  Abdominal: Soft  Bowel sounds are normal  He exhibits no distension  There is no tenderness  Musculoskeletal: He exhibits no tenderness  Lymphadenopathy:     He has no cervical adenopathy  Neurological: He is alert and oriented to person, place, and time  No cranial nerve deficit  Skin: Skin is warm and dry  No rash noted  Psychiatric: He has a normal mood and affect             Mahnaz Simons MD

## 2019-03-26 LAB
LEFT EYE DIABETIC RETINOPATHY: NORMAL
RIGHT EYE DIABETIC RETINOPATHY: NORMAL

## 2019-05-28 ENCOUNTER — OFFICE VISIT (OUTPATIENT)
Dept: FAMILY MEDICINE CLINIC | Facility: HOSPITAL | Age: 34
End: 2019-05-28
Payer: COMMERCIAL

## 2019-05-28 VITALS
BODY MASS INDEX: 28.75 KG/M2 | DIASTOLIC BLOOD PRESSURE: 70 MMHG | HEART RATE: 68 BPM | OXYGEN SATURATION: 96 % | WEIGHT: 224 LBS | HEIGHT: 74 IN | SYSTOLIC BLOOD PRESSURE: 124 MMHG | RESPIRATION RATE: 14 BRPM

## 2019-05-28 DIAGNOSIS — E66.3 OVERWEIGHT (BMI 25.0-29.9): ICD-10-CM

## 2019-05-28 DIAGNOSIS — Z00.00 ANNUAL PHYSICAL EXAM: Primary | ICD-10-CM

## 2019-05-28 DIAGNOSIS — E11.9 TYPE 2 DIABETES MELLITUS WITHOUT COMPLICATION, WITHOUT LONG-TERM CURRENT USE OF INSULIN (HCC): ICD-10-CM

## 2019-05-28 DIAGNOSIS — Z13.6 SCREENING FOR CARDIOVASCULAR CONDITION: ICD-10-CM

## 2019-05-28 LAB — SL AMB POCT HEMOGLOBIN AIC: 6.1 (ref ?–6.5)

## 2019-05-28 PROCEDURE — 99395 PREV VISIT EST AGE 18-39: CPT | Performed by: INTERNAL MEDICINE

## 2019-05-28 PROCEDURE — 83036 HEMOGLOBIN GLYCOSYLATED A1C: CPT | Performed by: INTERNAL MEDICINE

## 2020-03-31 LAB
LEFT EYE DIABETIC RETINOPATHY: NORMAL
RIGHT EYE DIABETIC RETINOPATHY: NORMAL

## 2021-06-25 ENCOUNTER — OFFICE VISIT (OUTPATIENT)
Dept: FAMILY MEDICINE CLINIC | Facility: HOSPITAL | Age: 36
End: 2021-06-25
Payer: COMMERCIAL

## 2021-06-25 VITALS
BODY MASS INDEX: 29.77 KG/M2 | WEIGHT: 232 LBS | HEIGHT: 74 IN | HEART RATE: 72 BPM | DIASTOLIC BLOOD PRESSURE: 78 MMHG | RESPIRATION RATE: 16 BRPM | SYSTOLIC BLOOD PRESSURE: 122 MMHG

## 2021-06-25 DIAGNOSIS — D86.0 SARCOIDOSIS OF LUNG (HCC): ICD-10-CM

## 2021-06-25 DIAGNOSIS — R06.83 SNORING: ICD-10-CM

## 2021-06-25 DIAGNOSIS — Z00.00 ANNUAL PHYSICAL EXAM: ICD-10-CM

## 2021-06-25 DIAGNOSIS — E11.9 TYPE 2 DIABETES MELLITUS WITHOUT COMPLICATION, WITHOUT LONG-TERM CURRENT USE OF INSULIN (HCC): Primary | ICD-10-CM

## 2021-06-25 PROCEDURE — 3725F SCREEN DEPRESSION PERFORMED: CPT | Performed by: INTERNAL MEDICINE

## 2021-06-25 PROCEDURE — 3008F BODY MASS INDEX DOCD: CPT | Performed by: INTERNAL MEDICINE

## 2021-06-25 PROCEDURE — 1036F TOBACCO NON-USER: CPT | Performed by: INTERNAL MEDICINE

## 2021-06-25 PROCEDURE — 99213 OFFICE O/P EST LOW 20 MIN: CPT | Performed by: INTERNAL MEDICINE

## 2021-06-25 PROCEDURE — 99395 PREV VISIT EST AGE 18-39: CPT | Performed by: INTERNAL MEDICINE

## 2021-06-25 NOTE — PROGRESS NOTES
[facility]    NAME: Kaveh Dunbar  AGE: 39 y o  SEX: male  : 1985     DATE: 2021     Assessment and Plan:     Problem List Items Addressed This Visit     None      Visit Diagnoses     Annual physical exam    -  Primary          Immunizations and preventive care screenings were discussed with patient today  Appropriate education was printed on patient's after visit summary  Counseling:  · Exercise: the importance of regular exercise/physical activity was discussed  Recommend exercise 3-5 times per week for at least 30 minutes  BMI Counseling: Body mass index is 30 19 kg/m²  The BMI is above normal  Nutrition recommendations include decreasing portion sizes  Exercise recommendations include moderate physical activity 150 minutes/week  Depression Screening and Follow-up Plan: Patient's depression screening was positive with a PHQ-2 score of 0  Clincally patient does not have depression  No treatment is required  No follow-ups on file  Chief Complaint:     No chief complaint on file  History of Present Illness:     Adult Annual Physical   Patient here for a comprehensive physical exam  The patient reports no problems  Diet and Physical Activity  · Diet/Nutrition: diabetic diet  · Exercise: no formal exercise  Depression Screening  PHQ-9 Depression Screening    PHQ-9:   Frequency of the following problems over the past two weeks:      Little interest or pleasure in doing things: 0 - not at all  Feeling down, depressed, or hopeless: 0 - not at all  PHQ-2 Score: 0       General Health  · Sleep: sleeps well  · Hearing: normal - bilateral   · Vision: no vision problems  · Dental: regular dental visits   Health  · History of STDs?: no      Review of Systems:     Review of Systems   Constitutional: Negative for chills, fever and unexpected weight change  HENT: Negative for hearing loss  Eyes: Negative for visual disturbance  Respiratory: Negative for cough and shortness of breath  Cardiovascular: Negative for chest pain  Gastrointestinal: Negative for abdominal pain and blood in stool  Genitourinary: Negative for difficulty urinating and hematuria  Musculoskeletal: Negative for arthralgias and joint swelling  Skin: Negative for rash  Psychiatric/Behavioral: Negative for dysphoric mood  Past Medical History:     History reviewed  No pertinent past medical history  Past Surgical History:     Past Surgical History:   Procedure Laterality Date    INCISION TENDON SHEATH Right     Radial styloid     KNEE ARTHROSCOPY      [place] BRONCHOSCOPY NEEDLE BX TRACHEA MAIN STEM&/BRON N/A 8/23/2017    Procedure: EBUS WITH BRONCHOSCOPY;  Surgeon: Aaron Santo DO;  [facility];  Service: Pulmonary      Social History:     Social History     Socioeconomic History    Marital status: /Civil Union     Spouse name: None    Number of children: None    Years of education: None    Highest education level: None   Occupational History    Occupation: Self-employed    Tobacco Use    Smoking status: Never Smoker    Smokeless tobacco: Never Used   Vaping Use    Vaping Use: Never used   Substance and Sexual Activity    Alcohol use: Not Currently     Comment: Social    Drug use: No    Sexual activity: Yes   Other Topics Concern    None   Social History Narrative    Dental care, occasionally    Full-time employment    Lives with spouse    Living situation: Supportive and safe    No advance directives      Social Determinants of Health     Financial Resource Strain:     Difficulty of Paying Living Expenses:    Food Insecurity:     Worried About Running Out of Food in the Last Year:     [address] in the Last Year:    Transportation Needs:     Lack of Transportation (Medical):      Lack of Transportation (Non-Medical):    Physical Activity:     Days of Exercise per Week:     Minutes of Exercise per Session:    Stress:     Feeling of Stress :    Social Connections:     Frequency of Communication with Friends and Family:     Frequency of Social Gatherings with Friends and Family:     Attends [employer] Services:     Active Member of Clubs or Organizations:     Attends Club or Organization Meetings:     Marital Status:    Intimate Partner Violence:     Fear of Current or Ex-Partner:     Emotionally Abused:     Physically Abused:     Sexually Abused:       Family History:     Family History   Problem Relation Age of Onset    Diabetes Mother     Diabetes Father     Coronary artery disease Father     Other Father     Heart disease Father     Kidney failure Father     Bone cancer Maternal Grandmother     Lung cancer Maternal Grandfather     Prostate cancer Maternal Uncle       Current Medications:     Current Outpatient Medications   Medication Sig Dispense Refill    Multiple Vitamins-Minerals (MULTIVITAMIN ADULT PO) Take 1 tablet by mouth daily      Omega-3 1000 MG CAPS Take 2 capsules by mouth daily       No current facility-administered medications for this visit  Allergies:     No Known Allergies   Physical Exam:     /70 (BP Location: Left arm, Patient Position: Sitting, Cuff Size: Standard)   Pulse 68   Ht 6' 1 5" (1 867 m)   Wt 105 kg (232 lb)   BMI 30 19 kg/m²     Physical Exam  Constitutional:       Appearance: He is well-developed  HENT:      Head: Normocephalic  Right Ear: Tympanic membrane normal  There is no impacted cerumen  Left Ear: Tympanic membrane normal  There is no impacted cerumen  Mouth/Throat:      Pharynx: Oropharynx is clear  No oropharyngeal exudate  Eyes:      Conjunctiva/sclera: Conjunctivae normal    Cardiovascular:      Rate and Rhythm: Normal rate and regular rhythm  Heart sounds: No murmur heard  Pulmonary:      Effort: No respiratory distress  Breath sounds: No wheezing or rales  Abdominal:      General: Bowel sounds are normal       Palpations: Abdomen is soft  Tenderness: There is no abdominal tenderness  Musculoskeletal:      Cervical back: Neck supple  Lymphadenopathy:      Cervical: No cervical adenopathy  Skin:     General: Skin is warm and dry  Findings: No erythema  Neurological:      Mental Status: He is alert and oriented to person, place, and time  Motor: No weakness        Gait: Gait normal    Psychiatric:         Mood and Affect: Mood normal           Maureen Milan MD   [facility]

## 2021-06-25 NOTE — ASSESSMENT & PLAN NOTE
Lab Results   Component Value Date    HGBA1C 6 1 05/28/2019     Denies symptoms of hyperglycemia  Will check a1c, urine microalbumin  Patient saw his ophthalmologist this year    He had no retinopathy as per records last year  On diabetic diet and lost weight  We discussed exercise to lose weight

## 2021-06-25 NOTE — PATIENT INSTRUCTIONS

## 2021-06-25 NOTE — PROGRESS NOTES
Assessment/Plan:    Type 2 diabetes mellitus without complication, without long-term current use of insulin ([facility])    Lab Results   Component Value Date    HGBA1C 6 1 05/28/2019     Denies symptoms of hyperglycemia  Will check a1c, urine microalbumin  Patient saw his ophthalmologist this year  He had no retinopathy as per records last year  On diabetic diet and lost weight  We discussed exercise to lose weight    Sarcoidosis of lung (HCC)  Denies fever, vision changes, rash, arthritis, cough, dyspnea    Will check cxr    Snoring  Pt grinds teeth and has loud snoring  BMI is >30  Pt has type 2 diabetes  Is at risk for sleep apnea  Neck circumference is 19 inches         Diagnoses and all orders for this visit:    Type 2 diabetes mellitus without complication, without long-term current use of insulin ([facility])  -     Hemoglobin A1C; Future  -     Microalbumin / creatinine urine ratio; Future  -     CBC; Future  -     Comprehensive metabolic panel; Future  -     TSH, 3rd generation with Free T4 reflex; Future  -     Lipid panel; Future    Sarcoidosis of lung (HCC)  -     XR chest pa & lateral; Future    Snoring  -     Home Study; Future    Annual physical exam          Subjective:      Patient ID: Brown Hatfield is a 39 y o  male  HPI      The following portions of the patient's history were reviewed and updated as appropriate: current medications, past family history, past medical history, past social history, past surgical history and problem list     Review of Systems   Constitutional: Negative for fever  HENT: Negative for congestion and hearing loss  Eyes: Negative for photophobia, pain and visual disturbance  Respiratory: Negative for cough, shortness of breath and wheezing  Cardiovascular: Negative for chest pain, palpitations and leg swelling  Gastrointestinal: Negative for abdominal pain, blood in stool and diarrhea  Endocrine: Negative for polydipsia and polyphagia     Genitourinary: Negative for difficulty urinating and hematuria  Musculoskeletal: Negative for joint swelling, myalgias and neck stiffness  Skin: Negative for rash  Neurological: Negative for weakness, numbness and headaches  Hematological: Negative for adenopathy  Psychiatric/Behavioral: Negative for dysphoric mood  All other systems reviewed and are negative  Objective:    /78   Pulse 72   Resp 16   Ht 6' 1 5" (1 867 m)   Wt 105 kg (232 lb)   BMI 30 19 kg/m²      Physical Exam  HENT:      Head: Normocephalic  Right Ear: Tympanic membrane normal  There is no impacted cerumen  Left Ear: Tympanic membrane normal  There is no impacted cerumen  Mouth/Throat:      Pharynx: Oropharynx is clear  No oropharyngeal exudate  Eyes:      Conjunctiva/sclera: Conjunctivae normal    Cardiovascular:      Rate and Rhythm: Normal rate and regular rhythm  Pulses: no weak pulses          Dorsalis pedis pulses are 2+ on the right side and 2+ on the left side  Posterior tibial pulses are 2+ on the right side and 2+ on the left side  Heart sounds: No murmur heard  Pulmonary:      Effort: No respiratory distress  Breath sounds: No wheezing or rales  Abdominal:      General: Bowel sounds are normal       Palpations: Abdomen is soft  Tenderness: There is no abdominal tenderness  Musculoskeletal:         General: No tenderness or deformity  Cervical back: Neck supple  Feet:      Right foot:      Skin integrity: Callus present  No ulcer  Left foot:      Skin integrity: Callus present  No ulcer  Skin:     General: Skin is warm and dry  Neurological:      Mental Status: He is alert and oriented to person, place, and time  Cranial Nerves: No cranial nerve deficit  Motor: No weakness  Gait: Gait normal    Psychiatric:         Mood and Affect: Mood normal          Diabetic Foot Exam    Patient's shoes and socks removed  Right Foot/Ankle   Right Foot Inspection  Skin Exam: callus and callus no ulcer                            Sensory       Monofilament testing: intact  Vascular    The right DP pulse is 2+  The right PT pulse is 2+  Left Foot/Ankle  Left Foot Inspection  Skin Exam: callusno ulcer                                         Sensory       Monofilament: intact  Vascular    The left DP pulse is 2+  The left PT pulse is 2+  Assign Risk Category:  No deformity present; No loss of protective sensation;  No weak pulses       Risk: 0      Evita Curtis MD

## 2021-06-25 NOTE — ASSESSMENT & PLAN NOTE
Pt grinds teeth and has loud snoring  BMI is >30  Pt has type 2 diabetes  Is at risk for sleep apnea  Neck circumference is 19 inches

## 2021-07-08 LAB
ALBUMIN SERPL-MCNC: 5.3 G/DL (ref 3.6–5.1)
ALBUMIN/CREAT UR: 35 MCG/MG CREAT
ALBUMIN/GLOB SERPL: 2 (CALC) (ref 1–2.5)
ALP SERPL-CCNC: 73 U/L (ref 36–130)
ALT SERPL-CCNC: 39 U/L (ref 9–46)
AST SERPL-CCNC: 18 U/L (ref 10–40)
BASOPHILS # BLD AUTO: 23 CELLS/UL (ref 0–200)
BASOPHILS NFR BLD AUTO: 0.3 %
BILIRUB SERPL-MCNC: 1.2 MG/DL (ref 0.2–1.2)
BUN SERPL-MCNC: 20 MG/DL (ref 7–25)
BUN/CREAT SERPL: ABNORMAL (CALC) (ref 6–22)
CALCIUM SERPL-MCNC: 10.3 MG/DL (ref 8.6–10.3)
CHLORIDE SERPL-SCNC: 99 MMOL/L (ref 98–110)
CHOLEST SERPL-MCNC: 199 MG/DL
CHOLEST/HDLC SERPL: 4.1 (CALC)
CO2 SERPL-SCNC: 29 MMOL/L (ref 20–32)
CREAT SERPL-MCNC: 1.12 MG/DL (ref 0.6–1.35)
CREAT UR-MCNC: 143 MG/DL (ref 20–320)
EOSINOPHIL # BLD AUTO: 92 CELLS/UL (ref 15–500)
EOSINOPHIL NFR BLD AUTO: 1.2 %
ERYTHROCYTE [DISTWIDTH] IN BLOOD BY AUTOMATED COUNT: 12.9 % (ref 11–15)
GLOBULIN SER CALC-MCNC: 2.7 G/DL (CALC) (ref 1.9–3.7)
GLUCOSE SERPL-MCNC: 131 MG/DL (ref 65–99)
HBA1C MFR BLD: 6.5 % OF TOTAL HGB
HCT VFR BLD AUTO: 51.3 % (ref 38.5–50)
HDLC SERPL-MCNC: 48 MG/DL
HGB BLD-MCNC: 17.3 G/DL (ref 13.2–17.1)
LDLC SERPL CALC-MCNC: 117 MG/DL (CALC)
LYMPHOCYTES # BLD AUTO: 1717 CELLS/UL (ref 850–3900)
LYMPHOCYTES NFR BLD AUTO: 22.3 %
MCH RBC QN AUTO: 28.5 PG (ref 27–33)
MCHC RBC AUTO-ENTMCNC: 33.7 G/DL (ref 32–36)
MCV RBC AUTO: 84.7 FL (ref 80–100)
MICROALBUMIN UR-MCNC: 5 MG/DL
MONOCYTES # BLD AUTO: 570 CELLS/UL (ref 200–950)
MONOCYTES NFR BLD AUTO: 7.4 %
NEUTROPHILS # BLD AUTO: 5298 CELLS/UL (ref 1500–7800)
NEUTROPHILS NFR BLD AUTO: 68.8 %
NONHDLC SERPL-MCNC: 151 MG/DL (CALC)
PLATELET # BLD AUTO: 179 THOUSAND/UL (ref 140–400)
PMV BLD REES-ECKER: 10.9 FL (ref 7.5–12.5)
POTASSIUM SERPL-SCNC: 4.1 MMOL/L (ref 3.5–5.3)
PROT SERPL-MCNC: 8 G/DL (ref 6.1–8.1)
RBC # BLD AUTO: 6.06 MILLION/UL (ref 4.2–5.8)
SL AMB EGFR AFRICAN AMERICAN: 97 ML/MIN/1.73M2
SL AMB EGFR NON AFRICAN AMERICAN: 83 ML/MIN/1.73M2
SODIUM SERPL-SCNC: 138 MMOL/L (ref 135–146)
TRIGL SERPL-MCNC: 219 MG/DL
TSH SERPL-ACNC: 1.12 MIU/L (ref 0.4–4.5)
WBC # BLD AUTO: 7.7 THOUSAND/UL (ref 3.8–10.8)

## 2021-07-08 PROCEDURE — 3060F POS MICROALBUMINURIA REV: CPT | Performed by: INTERNAL MEDICINE

## 2021-07-08 PROCEDURE — 3044F HG A1C LEVEL LT 7.0%: CPT | Performed by: INTERNAL MEDICINE

## 2021-07-09 NOTE — RESULT ENCOUNTER NOTE
Call patient: Javier Patrick normal cholesterol, blood sugar was 131, increase, A1c was 6 5, electrolytes and liver function tests were normal as well as kidney function test   Red blood cell counts were adjusted at higher which could be due to decreased fluid Intake  I would like to ask him to lose weight, decrease carbohydrate intake as much as possible  I would like to recheck his A1c and CBC in 3 months and I ordered those lab tests    I would like to see me in 3 months

## 2021-07-19 ENCOUNTER — TELEPHONE (OUTPATIENT)
Dept: SLEEP CENTER | Facility: CLINIC | Age: 36
End: 2021-07-19

## 2021-07-19 NOTE — TELEPHONE ENCOUNTER
----- Message from Pavan Kumar MD sent at 7/15/2021  1:48 PM EDT -----  approved  ----- Message -----  From: Bria Guzman  Sent: 7/14/2021  10:43 AM EDT  To: Sleep Medicine [facility] Provider    This sleep study needs approval      If approved please sign and return to clerical pool  If denied please include reasons why  Also provide alternative testing if warranted  Please sign and return to clerical pool

## 2021-10-28 ENCOUNTER — HOSPITAL ENCOUNTER (OUTPATIENT)
Dept: SLEEP CENTER | Facility: HOSPITAL | Age: 36
Discharge: HOME | End: 2021-10-28
Attending: INTERNAL MEDICINE
Payer: COMMERCIAL

## 2021-10-28 DIAGNOSIS — R06.83 SNORING: ICD-10-CM

## 2021-10-28 PROCEDURE — G0399 HOME SLEEP TEST/TYPE 3 PORTA: HCPCS

## 2021-10-30 PROCEDURE — 95806 SLEEP STUDY UNATT&RESP EFFT: CPT | Performed by: INTERNAL MEDICINE

## 2021-12-02 ENCOUNTER — RA CDI HCC (OUTPATIENT)
Dept: OTHER | Facility: HOSPITAL | Age: 36
End: 2021-12-02

## 2022-02-15 ENCOUNTER — TELEPHONE (OUTPATIENT)
Dept: OBGYN CLINIC | Facility: HOSPITAL | Age: 37
End: 2022-02-15

## 2022-02-15 NOTE — TELEPHONE ENCOUNTER
I received a Care Request from patient to schedule for:      Where Does it Hurt? Shoulder  Are you considering joint replacement? No   Are you seeking a second opinion? No  If yes, who is your doctor? Patient is scheduled

## 2022-03-01 ENCOUNTER — APPOINTMENT (OUTPATIENT)
Dept: RADIOLOGY | Facility: CLINIC | Age: 37
End: 2022-03-01
Payer: COMMERCIAL

## 2022-03-01 ENCOUNTER — OFFICE VISIT (OUTPATIENT)
Dept: OBGYN CLINIC | Facility: CLINIC | Age: 37
End: 2022-03-01
Payer: COMMERCIAL

## 2022-03-01 VITALS
HEIGHT: 74 IN | BODY MASS INDEX: 30.8 KG/M2 | SYSTOLIC BLOOD PRESSURE: 120 MMHG | DIASTOLIC BLOOD PRESSURE: 80 MMHG | WEIGHT: 240 LBS

## 2022-03-01 DIAGNOSIS — M25.511 RIGHT SHOULDER PAIN, UNSPECIFIED CHRONICITY: ICD-10-CM

## 2022-03-01 DIAGNOSIS — M75.41 IMPINGEMENT SYNDROME OF RIGHT SHOULDER REGION: Primary | ICD-10-CM

## 2022-03-01 PROCEDURE — 73030 X-RAY EXAM OF SHOULDER: CPT

## 2022-03-01 PROCEDURE — 99203 OFFICE O/P NEW LOW 30 MIN: CPT | Performed by: ORTHOPAEDIC SURGERY

## 2022-03-01 PROCEDURE — 3079F DIAST BP 80-89 MM HG: CPT | Performed by: ORTHOPAEDIC SURGERY

## 2022-03-01 PROCEDURE — 1036F TOBACCO NON-USER: CPT | Performed by: ORTHOPAEDIC SURGERY

## 2022-03-01 PROCEDURE — 3008F BODY MASS INDEX DOCD: CPT | Performed by: ORTHOPAEDIC SURGERY

## 2022-03-01 PROCEDURE — 3074F SYST BP LT 130 MM HG: CPT | Performed by: ORTHOPAEDIC SURGERY

## 2022-03-01 NOTE — PROGRESS NOTES
Assessment:     1  Impingement syndrome of right shoulder region        Plan:     Problem List Items Addressed This Visit        Other    Impingement syndrome of right shoulder region - Primary     Findings consistent with right shoulder impingement and tendonitis  Imaging and prognosis reviewed  Recommended to start physical therapy to rehab shoulder and maintain HEP  Avoid repetitive overhead and reaching behind activities at this time  Use OTC anti inflammatories for pain, OTC voltaren gel as well  See patient back in 6-8 weeks  If symptoms persist then may consider cortisone injection  All patient's questions were answered to his satisfaction  This note is created using dictation transcription  It may contain typographical errors, grammatical errors, improperly dictated words, background noise and other errors  Relevant Orders    XR shoulder 2+ vw right    Ambulatory Referral to Physical Therapy          Subjective:     Patient ID: Samantha Mcbride is a 39 y o  male  Chief Complaint:  39 yr old male RHD in for evaluation of his right shoulder  He is a   Denies any acute injury or trauma  Gradual progression of lateral shoulder pain since November  Pain worse when reaching behind or reaching out to grab an item  He also sleeps with arm above his head which is painful  At times he feels some weakness in shoulder  He denies any neck or radiating pain down right arm, denies numbness or tingling  No treatment  Information on patient's intake form was reviewed  Allergy:  No Known Allergies  Medications:  all current active meds have been reviewed  Past Medical History:  History reviewed  No pertinent past medical history    Past Surgical History:  Past Surgical History:   Procedure Laterality Date    INCISION TENDON SHEATH Right     Radial styloid     KNEE ARTHROSCOPY      [place] BRONCHOSCOPY NEEDLE BX TRACHEA MAIN STEM&/BRON N/A 8/23/2017    Procedure: EBUS WITH BRONCHOSCOPY;  Surgeon: Lino Montes DO;  [facility];  Service: Pulmonary     Family History:  Family History   Problem Relation Age of Onset    Diabetes Mother     Diabetes Father     Coronary artery disease Father     Other Father     Heart disease Father     Kidney failure Father     Bone cancer Maternal Grandmother     Lung cancer Maternal Grandfather     Prostate cancer Maternal Uncle      Social History:  Social History     Substance and Sexual Activity   Alcohol Use Not Currently    Comment: Social     Social History     Substance and Sexual Activity   Drug Use No     Social History     Tobacco Use   Smoking Status Never Smoker   Smokeless Tobacco Never Used     Review of Systems   Constitutional: Negative for chills and fever  HENT: Negative for ear pain and sore throat  Eyes: Negative for pain and visual disturbance  Respiratory: Negative for cough and shortness of breath  Cardiovascular: Negative for chest pain and palpitations  Gastrointestinal: Negative for abdominal pain and vomiting  Genitourinary: Negative for dysuria and hematuria  Musculoskeletal: Positive for arthralgias (Right shoulder)  Negative for back pain, joint swelling and neck pain  Skin: Negative for color change and rash  Neurological: Negative for seizures and syncope  Psychiatric/Behavioral: Negative  All other systems reviewed and are negative  Objective:  BP Readings from Last 1 Encounters:   03/01/22 120/80      Wt Readings from Last 1 Encounters:   03/01/22 109 kg (240 lb)      BMI:   Estimated body mass index is 31 23 kg/m² as calculated from the following:    Height as of this encounter: 6' 1 5" (1 867 m)  Weight as of this encounter: 109 kg (240 lb)  BSA:   Estimated body surface area is 2 34 meters squared as calculated from the following:    Height as of this encounter: 6' 1 5" (1 867 m)  Weight as of this encounter: 109 kg (240 lb)  Physical Exam  Vitals and nursing note reviewed  Constitutional:       Appearance: Normal appearance  He is well-developed  HENT:      Head: Normocephalic and atraumatic  Right Ear: External ear normal       Left Ear: External ear normal    Eyes:      Extraocular Movements: Extraocular movements intact  Conjunctiva/sclera: Conjunctivae normal    Pulmonary:      Effort: Pulmonary effort is normal    Musculoskeletal:         General: Tenderness (right shoulder arthralgia ) present  Cervical back: Neck supple  Skin:     General: Skin is warm and dry  Neurological:      Mental Status: He is alert and oriented to person, place, and time  Deep Tendon Reflexes: Reflexes are normal and symmetric  Psychiatric:         Mood and Affect: Mood normal          Behavior: Behavior normal        Right Shoulder Exam     Tenderness   The patient is experiencing no tenderness  Range of Motion   The patient has normal right shoulder ROM  Right shoulder internal rotation 0 degrees: pain  Muscle Strength   Abduction: 5/5   Internal rotation: 5/5   External rotation: 5/5   Biceps: 5/5     Tests   Boyer test: negative  Cross arm: positive  Impingement: positive  Drop arm: negative    Other   Erythema: absent  Scars: absent  Sensation: normal  Pulse: present    Comments:  Positive speeds   Negative falcon             I have personally reviewed pertinent films in PACS and my interpretation is xr right shoulder demonstrates mild degenerative changes AC joint, os acromiale         Scribe Attestation    I,:  Ellie Borjas am acting as a scribe while in the presence of the attending physician :       I,:  Lambert Funez MD personally performed the services described in this documentation    as scribed in my presence :

## 2022-03-01 NOTE — ASSESSMENT & PLAN NOTE
Findings consistent with right shoulder impingement and tendonitis  Imaging and prognosis reviewed  Recommended to start physical therapy to rehab shoulder and maintain HEP  Avoid repetitive overhead and reaching behind activities at this time  Use OTC anti inflammatories for pain, OTC voltaren gel as well  See patient back in 6-8 weeks  If symptoms persist then may consider cortisone injection  All patient's questions were answered to his satisfaction  This note is created using dictation transcription  It may contain typographical errors, grammatical errors, improperly dictated words, background noise and other errors

## 2022-03-04 ENCOUNTER — EVALUATION (OUTPATIENT)
Dept: PHYSICAL THERAPY | Facility: CLINIC | Age: 37
End: 2022-03-04
Payer: COMMERCIAL

## 2022-03-04 DIAGNOSIS — M75.41 IMPINGEMENT SYNDROME OF RIGHT SHOULDER REGION: Primary | ICD-10-CM

## 2022-03-04 PROCEDURE — 97162 PT EVAL MOD COMPLEX 30 MIN: CPT | Performed by: PHYSICAL THERAPIST

## 2022-03-04 NOTE — PROGRESS NOTES
PT Evaluation     Today's date: 3/4/2022  Patient name: Faiza Tate  : 1985  MRN: [id number]  Referring provider: Jossy Vasquez MD  Dx:   Encounter Diagnosis     ICD-10-CM    1  Impingement syndrome of right shoulder region  M75 [id number] Ambulatory Referral to Physical Therapy                  Assessment  Assessment details: Pt is a 39y o  year old male coming to outpatient PT with a diagnosis of R shoulder tendonitis and impingement   Pt presents with increased pain and TTP, decreased R shoulder ER ROM, decreased R shoulder abduction strength, and overall decreased functional mobility  Pt would benefit from skilled PT services in order to address these deficits and reach maximum level of function  Thank you kindly for the referral!    Pt was issued an initial HEP with written instructions  Impairments: abnormal or restricted ROM, activity intolerance, impaired physical strength, lacks appropriate home exercise program and pain with function  Understanding of Dx/Px/POC: good   Prognosis: good    Goals  STG's ( 3-4 weeks)  1  Pt will be independent in HEP  2  Pt will have full painfree ROM  LTG's ( 6- 8 weeks)  1  Improve FOTO score by 8-10 points  2  Pt will have R shoulder strength 5/5  3  Pt will have mild pain with work activities as a   4  Pt will have less pain with lifting activities  5  Pt will have decreased pain when pushing/pulling with gripping  6  Pt will have less pain when reaching behind his back    Plan  Patient would benefit from: PT eval and skilled physical therapy  Planned modality interventions: cryotherapy  Planned therapy interventions: manual therapy, joint mobilization, neuromuscular re-education, home exercise program, functional ROM exercises, flexibility, therapeutic activities, stretching, strengthening, therapeutic exercise and therapeutic training  Frequency: 1-2 times per week    Duration in weeks: 6  Plan of Care beginning date: 3/4/2022  Plan of Care expiration date: 4/15/2022  Treatment plan discussed with: patient        Subjective Evaluation    History of Present Illness  Mechanism of injury: Pt reports R shoulder pain onset  without injury  Pt notes his R shoulder pain has not improved so he went to ortho  Pt has increased pain when reaching behind his back, reaching out to the side above 90*, and pulling and pushing while gripping  Pt has pain when sleeping on his R side, especially when sleeping overhead  Pt is cautious with work activities  Pt is not taking OTC medications  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      Work:   Hobbies: [employer]  Gait: no abnormalities  Pain  At best pain ratin  At worst pain ratin  Location: R shoulder  Quality: sharp  Relieving factors: rest    Social Support  Lives with: young children    Employment status: working  Hand dominance: left      Diagnostic Tests  X-ray: normal  Treatments  No previous or current treatments  Patient Goals  Patient goals for therapy: decreased pain  Patient goal: no pain and avoid surgery        Objective     Neurological Testing     Sensation     Shoulder   Left Shoulder   Intact: light touch    Right Shoulder   Intact: light touch    Reflexes   Left   Biceps (C5/C6): normal (2+)  Brachioradialis (C6): normal (2+)  Triceps (C7): normal (2+)    Right   Biceps (C5/C6): normal (2+)  Brachioradialis (C6): normal (2+)  Triceps (C7): normal (2+)    Active Range of Motion     Right Shoulder   Flexion: 160 degrees with pain  Abduction: WFL and with pain  External rotation 45°: 70 degrees with pain  Internal rotation 45°: [facility]    Additional Active Range of Motion Details  (-) supraspinous test  (-) Tresea Spinner test  (+) impingement sign  (+) Speed's test  Cervical AROM: WFL's  Elbow AROM and strength: WFL's    Passive Range of Motion     Right Shoulder   Flexion: WFL  Abduction: WFL and with pain  External rotation 45°: 70 degrees with pain  Internal rotation 45°: [facility]    Strength/Myotome Testing     Left Shoulder   Normal muscle strength    Right Shoulder     Planes of Motion   Flexion: 4+   Abduction: 4 (pain)   External rotation at 0°: 5   Internal rotation at 0°: 5             Dx: R shld tendonitis/ impingement syndrome  EPOC: 4/15/22  CO-MORBIDITIES:  PERSONAL FACTORS: works as a   Precautions: none      Manuals 3/4            R shld PROM             R shld MFR/ GISTM th            K tape V down th                         Neuro Re-Ed             TB LPD             TB row             TB IR             B TB ER             Prone plank             Prone squeeze and hold             Prone T             Prone W             Body blade IR/ER                                       Ther Ex             HEP instruct Blue TB 5'            Posterior capsule stretch             shld scap isometric 7#            S/l shld ER 5#            TB wall climb                                                    Ther Activity                                       Gait Training                                       Modalities             Cp post tx- prn

## 2022-03-08 ENCOUNTER — OFFICE VISIT (OUTPATIENT)
Dept: PHYSICAL THERAPY | Facility: CLINIC | Age: 37
End: 2022-03-08
Payer: COMMERCIAL

## 2022-03-08 DIAGNOSIS — M75.41 IMPINGEMENT SYNDROME OF RIGHT SHOULDER REGION: Primary | ICD-10-CM

## 2022-03-08 PROCEDURE — 97140 MANUAL THERAPY 1/> REGIONS: CPT

## 2022-03-08 PROCEDURE — 97110 THERAPEUTIC EXERCISES: CPT

## 2022-03-08 PROCEDURE — 97112 NEUROMUSCULAR REEDUCATION: CPT

## 2022-03-08 NOTE — PROGRESS NOTES
Daily Note     Today's date: 3/8/2022  Patient name: Linda Cruz  : 1985  MRN: [id number]  Referring provider: Ginger Mcmahon MD  Dx:   Encounter Diagnosis     ICD-10-CM    1  Impingement syndrome of right shoulder region  M75 41                   Subjective: Pt reports no change since IE  States still getting pain mid delt  Objective: See treatment diary below      Assessment: Tolerated treatment well w/ initial instruction of TE's per flow sheet  Patient demonstrated weakness in lower traps and ER's  Nees cont'd strengthening in shld postural mm's  Plan: Continue per plan of care  Progress treatment as tolerated         Dx: R shld tendonitis/ impingement syndrome  EPOC: 4/15/22  CO-MORBIDITIES:  PERSONAL FACTORS: works as a   Precautions: none      Manuals 3/4 3/8           R shld PROM  JK           R shld MFR/ GISTM th JK           K tape V down               15'           Neuro Re-Ed             TB LPD  MTB 20           TB row  MTB 20           TB IR  MTB 20           B TB ER  MTB 20           Prone plank             Prone squeeze and hold  10x10"           Prone T  10           Prone W  10           Body blade IR/ER  nv           Prone ext  10                        Ther Ex             HEP instruct Blue TB 5'            Posterior capsule stretch             shld scap isometric 7# SL 5# KB 1'           S/l shld ER 5# 5# 20           TB wall climb                                                    Ther Activity                                       Gait Training                                       Modalities             Cp post tx- prn

## 2022-03-17 ENCOUNTER — OFFICE VISIT (OUTPATIENT)
Dept: PHYSICAL THERAPY | Facility: CLINIC | Age: 37
End: 2022-03-17
Payer: COMMERCIAL

## 2022-03-17 DIAGNOSIS — M75.41 IMPINGEMENT SYNDROME OF RIGHT SHOULDER REGION: Primary | ICD-10-CM

## 2022-03-17 PROCEDURE — 97140 MANUAL THERAPY 1/> REGIONS: CPT

## 2022-03-17 PROCEDURE — 97110 THERAPEUTIC EXERCISES: CPT

## 2022-03-17 PROCEDURE — 97112 NEUROMUSCULAR REEDUCATION: CPT

## 2022-03-17 NOTE — PROGRESS NOTES
Daily Note     Today's date: 3/17/2022  Patient name: Jerzy Murphy  : 1985  MRN: [id number]  Referring provider: Junior Abraham MD  Dx:   Encounter Diagnosis     ICD-10-CM    1  Impingement syndrome of right shoulder region  M75 41                   Subjective: Pt reports post LV was the best he has felt in weeks  States going on vacation following and having increased pain even at rest now 2/10  Objective: See treatment diary below      Assessment: Tolerated treatment well  Patient cont'd w/ a pinching sensation at end range  Pt cont's to have fatigue when isolating ER's  Needs cont'd strengthening  Plan: Continue per plan of care  Progress treatment as tolerated         Dx: R shld tendonitis/ impingement syndrome  EPOC: 4/15/22  CO-MORBIDITIES:  PERSONAL FACTORS: works as a   Precautions: none      Manuals 3/4 3/8 3/17          R shld PROM  JK JK          R shld MFR/ GISTM th JK JK          K tape V down               15' 15'          Neuro Re-Ed             TB LPD  MTB 20 MTB 20          TB row  MTB 20 MTB 20          TB IR  MTB 20 MTB 20          B TB ER  MTB 20 MTB 20          Prone plank             Prone squeeze and hold  10x10" 10x10"          Prone T  10 10          Prone W  10 10          Body blade IR/ER  nv 30"x2          Prone ext  10 10                       Ther Ex             HEP instruct Blue TB 5'            Posterior capsule stretch             shld scap isometric 7# SL 5# KB 1' SL 5# KB 1'          S/l shld ER 5# 5# 20 5# 20          TB wall climb             UBE patrick   3'                                    Ther Activity                                       Gait Training                                       Modalities             Cp post tx- prn

## 2022-03-24 ENCOUNTER — OFFICE VISIT (OUTPATIENT)
Dept: PHYSICAL THERAPY | Facility: CLINIC | Age: 37
End: 2022-03-24
Payer: COMMERCIAL

## 2022-03-24 DIAGNOSIS — M75.41 IMPINGEMENT SYNDROME OF RIGHT SHOULDER REGION: Primary | ICD-10-CM

## 2022-03-24 PROCEDURE — 97140 MANUAL THERAPY 1/> REGIONS: CPT | Performed by: PHYSICAL THERAPIST

## 2022-03-24 PROCEDURE — 97110 THERAPEUTIC EXERCISES: CPT | Performed by: PHYSICAL THERAPIST

## 2022-03-24 PROCEDURE — 97112 NEUROMUSCULAR REEDUCATION: CPT | Performed by: PHYSICAL THERAPIST

## 2022-03-24 NOTE — PROGRESS NOTES
Daily Note     Today's date: 3/24/2022  Patient name: Samantha Mcbride  : 1985  MRN: [id number]  Referring provider: Ady Dudley MD  Dx:   Encounter Diagnosis     ICD-10-CM    1  Impingement syndrome of right shoulder region  M75 41                   Subjective: Pt felt sore after last visit  Pt started to feel better on Tuesday  1/10 pain levels currently  Pt did not feel a change with K tape  Objective: See treatment diary below      Assessment: Tolerated treatment well  Patient exhibited good technique with therapeutic exercises  Pt had some pinching pain at end ROM, however pt had full PROM  Mild erythemia with GISTM tx  Plan: Continue per plan of care  continue to progress strengthening ex as able       Dx: R shld tendonitis/ impingement syndrome  EPOC: 4/15/22  CO-MORBIDITIES:  PERSONAL FACTORS: works as a   Precautions: none      Manuals 3/4 3/8 3/17 3/24         R shld PROM  JK JK th         R shld MFR/ GISTM th JK JK th         K tape V down               15' 15' 15'         Neuro Re-Ed             TB LPD  MTB 20 MTB 20 CCx45# 2x10         TB row  MTB 20 MTB 20 CC 45# x20         TB IR  MTB 20 MTB 20 MTB x20         B TB ER  MTB 20 MTB 20 MTB x20         Prone plank    20"x2         Prone squeeze and hold  10x10" 10x10" 10x10"         Prone T  10 10 10         Prone W  10 10 10         Body blade IR/ER  nv 30"x2 20"x3         Prone row  10 10 10                      Ther Ex             HEP instruct Blue TB 5'   Issue maroon         Posterior capsule stretch    3x20"         shld scap isometric 7# SL 5# KB 1' SL 5# KB 1' SL 5# KB 1'         S/l shld ER 5# 5# 20 5# 20 5# x20         TB wall climb             UBE patrick   3' 3'         Stand scap isometric at wall    5# 10"x10                      Ther Activity                                       Gait Training                                       Modalities             Cp post tx- prn

## 2022-03-31 ENCOUNTER — OFFICE VISIT (OUTPATIENT)
Dept: PHYSICAL THERAPY | Facility: CLINIC | Age: 37
End: 2022-03-31
Payer: COMMERCIAL

## 2022-03-31 DIAGNOSIS — M75.41 IMPINGEMENT SYNDROME OF RIGHT SHOULDER REGION: Primary | ICD-10-CM

## 2022-03-31 PROCEDURE — 97110 THERAPEUTIC EXERCISES: CPT

## 2022-03-31 PROCEDURE — 97140 MANUAL THERAPY 1/> REGIONS: CPT

## 2022-03-31 PROCEDURE — 97112 NEUROMUSCULAR REEDUCATION: CPT

## 2022-03-31 NOTE — PROGRESS NOTES
Daily Note     Today's date: 3/31/2022  Patient name: Agustina Sylvester  : 1985  MRN: [id number]  Referring provider: Rafia Agarwal MD  Dx:   Encounter Diagnosis     ICD-10-CM    1  Impingement syndrome of right shoulder region  M75 41                   Subjective:Pt c/o constant shld pain 2/10  Objective: See treatment diary below      Assessment: Tolerated treatment fair  Patient demonstrates weakness w/ LT ex's in prone  Pt w/ tightness in the UT, bicep and supra  Plan: Continue per plan of care  Progress treatment as tolerated         Dx: R shld tendonitis/ impingement syndrome  EPOC: 4/15/22  CO-MORBIDITIES:  PERSONAL FACTORS: works as a   Precautions: none      Manuals 3/4 3/8 3/17 3/24 3/31        R shld PROM  JK JK  JK        R shld MFR/ GISTM th JK JK th JK        K tape V down th              15' 15' 15' 15'        Neuro Re-Ed             TB LPD  MTB 20 MTB 20 CCx45# 2x10 CCx45# 2x10        TB row  MTB 20 MTB 20 CC 45# x20 CCx45# 2x10        TB IR  MTB 20 MTB 20 MTB x20 MTB x20        B TB ER  MTB 20 MTB 20 MTB x20 MTB x20        Prone plank    20"x2         Prone squeeze and hold  10x10" 10x10" 10x10" 10x10"        Prone T  10 10 10 15        Prone W  10 10 10 15        Body blade IR/ER  nv 30"x2 20"x3 20"x3        Prone row  10 10 10 10                     Ther Ex             HEP instruct Blue TB 5'   Issue maroon         Posterior capsule stretch    3x20" 3x20"        shld scap isometric 7# SL 5# KB 1' SL 5# KB 1' SL 5# KB 1' SL 5# KB 1'        S/l shld ER 5# 5# 20 5# 20 5# x20 5# x20        TB wall climb             UBE patrick   3' 3' 3'        Stand scap isometric at wall    5# 10"x10 5# 5x10"                     Ther Activity                                       Gait Training                                       Modalities             Cp post tx- prn

## 2022-04-07 ENCOUNTER — OFFICE VISIT (OUTPATIENT)
Dept: PHYSICAL THERAPY | Facility: CLINIC | Age: 37
End: 2022-04-07
Payer: COMMERCIAL

## 2022-04-07 DIAGNOSIS — M75.41 IMPINGEMENT SYNDROME OF RIGHT SHOULDER REGION: Primary | ICD-10-CM

## 2022-04-07 PROCEDURE — 97110 THERAPEUTIC EXERCISES: CPT | Performed by: PHYSICAL THERAPIST

## 2022-04-07 PROCEDURE — 97112 NEUROMUSCULAR REEDUCATION: CPT | Performed by: PHYSICAL THERAPIST

## 2022-04-07 PROCEDURE — 97140 MANUAL THERAPY 1/> REGIONS: CPT | Performed by: PHYSICAL THERAPIST

## 2022-04-07 NOTE — PROGRESS NOTES
PT Re-Evaluation     Today's date: 2022  Patient name: Idalmis Dumont  : 1985  MRN: [id number]  Referring provider: Wagner Rodriguez MD  Dx:   Encounter Diagnosis     ICD-10-CM    1  Impingement syndrome of right shoulder region  M75 41                   Assessment  Assessment details: Since starting skilled PT, pain levels are decreased slightly, R shoulder ROM has improved, R shoulder strength improved with gradual functional progress  Recommend pt continue skilled PT  Impairments: activity intolerance, impaired physical strength and pain with function  Understanding of Dx/Px/POC: good   Prognosis: good    Goals  STG's ( 3-4 weeks)  1  Pt will be independent in HEP-met  2  Pt will have full painfree ROM -met  LTG's ( 6- 8 weeks)  1  Improve FOTO score by 8-10 points-partial met  2  Pt will have R shoulder strength 5/5--partial met  3  Pt will have mild pain with work activities as a - not met  4  Pt will have less pain with lifting activities-not met  5  Pt will have decreased pain when pushing/pulling with gripping- not met  6  Pt will have less pain when reaching behind his back-partial met    Plan  Patient would benefit from: skilled physical therapy  Planned modality interventions: cryotherapy  Planned therapy interventions: manual therapy, joint mobilization, neuromuscular re-education, home exercise program, functional ROM exercises, flexibility, therapeutic activities, stretching, strengthening, therapeutic exercise and therapeutic training  Frequency: 1x week  Duration in weeks: 6  Plan of Care beginning date: 2022  Plan of Care expiration date: 2022  Treatment plan discussed with: patient        Subjective Evaluation    History of Present Illness  Mechanism of injury: IE  : Pt reports R shoulder pain onset  without injury  Pt notes his R shoulder pain has not improved so he went to ortho    Pt has increased pain when reaching behind his back, reaching out to the side above 90*, and pulling and pushing while gripping  Pt has pain when sleeping on his R side, especially when sleeping overhead  Pt is cautious with work activities  Pt is not taking OTC medications  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  22: Last week, pt had constant pain, even at rest, however this week his pain is more intermittent  Pt continues to have pain reaching behind his back and out to the side above 90*  Pt has pain when sleeping, but it does wake him up  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 and is able to do them with less pain  Pt continues to have pain with work activities especially pushing/pulling while gripping  Work:   Hobbies: shoots archery  Gait: no abnormalities  Pain  At best pain ratin  At worst pain ratin  Location: R shoulder  Quality: sharp  Relieving factors: rest    Social Support  Lives with: young children    Employment status: working  Hand dominance: left      Diagnostic Tests  X-ray: normal  Treatments  No previous or current treatments  Patient Goals  Patient goals for therapy: decreased pain  Patient goal: no pain and avoid surgery        Objective     Neurological Testing     Sensation     Shoulder   Left Shoulder   Intact: light touch    Right Shoulder   Intact: light touch    Reflexes   Left   Biceps (C5/C6): normal (2+)  Brachioradialis (C6): normal (2+)  Triceps (C7): normal (2+)    Right   Biceps (C5/C6): normal (2+)  Brachioradialis (C6): normal (2+)  Triceps (C7): normal (2+)    Active Range of Motion     Right Shoulder   Flexion: 160 degrees   Abduction: Right shoulder active abduction: mild    5/10   WFL and with pain  External rotation 45°: 90 degrees with pain  Internal rotation 45°: [facility]    Additional Active Range of Motion Details  (-) supraspinous test  (-) Rendell Rife test  (+) impingement sign  (+) Speed's test  Cervical AROM: WFL's  Elbow AROM and strength: WFL's    Passive Range of Motion     Right Shoulder Flexion: WFL  Abduction: WFL and with pain  External rotation 45°: 90 degrees with pain  Internal rotation 45°: [facility]    Strength/Myotome Testing     Left Shoulder   Normal muscle strength    Right Shoulder     Planes of Motion   Flexion: 5 (mild pain)   Abduction: 4+ (pain  1/10)   External rotation at 0°: 5   Internal rotation at 0°: 5          Dx: R shld tendonitis/ impingement syndrome  EPOC: 4/15/22  CO-MORBIDITIES:  PERSONAL FACTORS: works as a   Precautions: none      Manuals 3/4 3/8 3/17 3/24 3/31 4/7       R shld PROM  JK JK  JK        R shld MFR/ GISTM  JK JK  JK        proress note th th         13' 15' 15' 15' 15'       Neuro Re-Ed             TB LPD  MTB 20 MTB 20 CCx45# 2x10 CCx45# 2x10 CC 45# 2x10       TB row  MTB 20 MTB 20 CC 45# x20 CCx45# 2x10 CC 45#2x10       TB IR  MTB 20 MTB 20 MTB x20 MTB x20 MTB x20       B TB ER  MTB 20 MTB 20 MTB x20 MTB x20 MTB x20       Prone plank    20"x2         Prone squeeze and hold  10x10" 10x10" 10x10" 10x10"        Prone T  10 10 10 15        Prone W  10 10 10 15        Body blade IR/ER  nv 30"x2 20"x3 20"x3 20"x3       Prone row  10 10 10 10                     Ther Ex             HEP instruct Blue TB 5'   Issue maroon         Posterior capsule stretch    3x20" 3x20" 3x20"       shld scap isometric 7# SL 5# KB 1' SL 5# KB 1' SL 5# KB 1' SL 5# KB 1' SL 7 5# KB 1'       S/l shld ER 5# 5# 20 5# 20 5# x20 5# x20 6#x20       TB wall climb      OTB x5       UBE patrick   3' 3' 3' 3'       Stand scap isometric at wall    5# 10"x10 5# 5x10" 5# 5"x10       Quick tennis ball catch in s/l      x10       Ball roll at wall; flex, abd      2# 10 CW, CCW                                 Gait Training                                       Modalities             Cp post tx- prn

## 2022-04-14 ENCOUNTER — OFFICE VISIT (OUTPATIENT)
Dept: PHYSICAL THERAPY | Facility: CLINIC | Age: 37
End: 2022-04-14
Payer: COMMERCIAL

## 2022-04-14 DIAGNOSIS — M75.41 IMPINGEMENT SYNDROME OF RIGHT SHOULDER REGION: Primary | ICD-10-CM

## 2022-04-14 PROCEDURE — 97010 HOT OR COLD PACKS THERAPY: CPT

## 2022-04-14 PROCEDURE — 97140 MANUAL THERAPY 1/> REGIONS: CPT

## 2022-04-14 PROCEDURE — 97112 NEUROMUSCULAR REEDUCATION: CPT

## 2022-04-14 PROCEDURE — 97110 THERAPEUTIC EXERCISES: CPT

## 2022-04-14 NOTE — PROGRESS NOTES
Daily Note     Today's date: 2022  Patient name: Susana Ca  : 1985  MRN: [id number]  Referring provider: Jose Miguel South MD  Dx:   Encounter Diagnosis     ICD-10-CM    1  Impingement syndrome of right shoulder region  M75 41                   Subjective: Pt reports his shld "is not terrible but not pain free either"  Pt reports his shld has been better this week  Reports having a very busy work week and just spent in general       Objective: See treatment diary below      Assessment: Tolerated treatment fair due to fatigue coming from work  Patient demonstrated fatigue post treatment and would benefit from continued PT for cont'd strengthening in LT and ER's  Pt demonstrated weakness w/ prone ex's and required tactile cueing  Plan: Continue per plan of care  Progress treatment as tolerated         Dx: R shld tendonitis/ impingement syndrome  EPOC: 22  CO-MORBIDITIES:  PERSONAL FACTORS: works as a   Precautions: none      Manuals 3/4 3/8 3/17 3/24 3/31 4/7 4/14      R shld PROM  JK JK  JK  JK      R shld MFR/ GISTM  JK JK  JK  JK      proress note          13' 15' 15' 15' 15' 15'      Neuro Re-Ed             TB LPD  MTB 20 MTB 20 CCx45# 2x10 CCx45# 2x10 CC 45# 2x10 CC 45# 2x10      TB row  MTB 20 MTB 20 CC 45# x20 CCx45# 2x10 CC 45#2x10 CC 45#2x10      TB IR  MTB 20 MTB 20 MTB x20 MTB x20 MTB x20 MTB x20      B TB ER  MTB 20 MTB 20 MTB x20 MTB x20 MTB x20 MTB x20      Prone plank    20"x2         Prone squeeze and hold  10x10" 10x10" 10x10" 10x10"        Prone T  10 10 10 15  20      Prone W  10 10 10 15  20      Body blade IR/ER  nv 30"x2 20"x3 20"x3 20"x3 20"x3      Prone row  10 10 10 10  2# 20                   Ther Ex             HEP instruct Blue TB 5'   Issue maroon         Posterior capsule stretch    3x20" 3x20" 3x20"       shld scap isometric 7# SL 5# KB 1' SL 5# KB 1' SL 5# KB 1' SL 5# KB 1' SL 7 5# KB 1' nv      S/l shld ER 5# 5# 20 5# 20 5# x20 5# x20 6#x20 nv      TB wall climb      OTB x5 OTB 10x      UBE patrick   3' 3' 3' 3' 3'      Stand scap isometric at wall    5# 10"x10 5# 5x10" 5# 5"x10 5# 5"x10      Quick tennis ball catch in s/l      x10 nv      Ball roll at wall; flex, abd      2# 10 CW, CCW nv                                Gait Training                                       Modalities             Cp post tx- prn       10'

## 2022-04-21 ENCOUNTER — OFFICE VISIT (OUTPATIENT)
Dept: PHYSICAL THERAPY | Facility: CLINIC | Age: 37
End: 2022-04-21
Payer: COMMERCIAL

## 2022-04-21 DIAGNOSIS — M75.41 IMPINGEMENT SYNDROME OF RIGHT SHOULDER REGION: Primary | ICD-10-CM

## 2022-04-21 PROCEDURE — 97110 THERAPEUTIC EXERCISES: CPT

## 2022-04-21 PROCEDURE — 97140 MANUAL THERAPY 1/> REGIONS: CPT

## 2022-04-21 PROCEDURE — 97112 NEUROMUSCULAR REEDUCATION: CPT

## 2022-04-21 NOTE — PROGRESS NOTES
Daily Note     Today's date: 2022  Patient name: Isabella Sepulveda  : 1985  MRN: [id number]  Referring provider: Se Byers MD  Dx:   Encounter Diagnosis     ICD-10-CM    1  Impingement syndrome of right shoulder region  M75 41                   Subjective: Pt reports he cont's w/ mid delt pain  States over the weekend he went to close the garage door and had excruciating deep ant shld pain  Reports the pain was worse than prior then coming to PT  Objective: See treatment diary below      Assessment: Tolerated treatment well  Patient improving w/ prone ex's  Pt cont's w/ tightness in the terres, infra and supra  Plan: Continue per plan of care  Progress treatment as tolerated         Dx: R shld tendonitis/ impingement syndrome  EPOC: 22  CO-MORBIDITIES:  PERSONAL FACTORS: works as a   Precautions: none      Manuals 3/4 3/8 3/17 3/24 3/31 4/7 4/14 4/21     R shld PROM  JK JK  JK  JK JK     R shld MFR/ GISTM  JK JK  JK  JK JK     proress note          13' 15' 15' 15' 15' 15' 15'     Neuro Re-Ed             TB LPD  MTB 20 MTB 20 CCx45# 2x10 CCx45# 2x10 CC 45# 2x10 CC 45# 2x10 CC 45#2x10     TB row  MTB 20 MTB 20 CC 45# x20 CCx45# 2x10 CC 45#2x10 CC 45#2x10 CC 45#2x10     TB IR  MTB 20 MTB 20 MTB x20 MTB x20 MTB x20 MTB x20 MTB x20     B TB ER  MTB 20 MTB 20 MTB x20 MTB x20 MTB x20 MTB x20 MTB x20     Prone plank    20"x2         Prone squeeze and hold  10x10" 10x10" 10x10" 10x10"   10"x10     Prone T  10 10 10 15  20 1# 10     Prone W  10 10 10 15  20 1# 10     Body blade IR/ER  nv 30"x2 20"x3 20"x3 20"x3 20"x3      Prone row  10 10 10 10  2# 20 1# 10                  Ther Ex             HEP instruct Blue TB 5'   Issue maroon         Posterior capsule stretch    3x20" 3x20" 3x20"       shld scap isometric 7# SL 5# KB 1' SL 5# KB 1' SL 5# KB 1' SL 5# KB 1' SL 7 5# KB 1' nv      S/l shld ER 5# 5# 20 5# 20 5# x20 5# x20 6#x20 nv      TB wall climb      OTB x5 OTB 10x OTB 10x     UBE patrick   3' 3' 3' 3' 3' 3'     Stand scap isometric at wall    5# 10"x10 5# 5x10" 5# 5"x10 5# 5"x10 5# 5"x10     Quick tennis ball catch in s/l      x10 nv      Ball roll at wall; flex, abd      2# 10 CW, CCW nv 2# 10 CW, CCW                               Gait Training                                       Modalities             Cp post tx- prn       10'

## 2022-07-18 ENCOUNTER — TELEPHONE (OUTPATIENT)
Dept: FAMILY MEDICINE CLINIC | Facility: HOSPITAL | Age: 37
End: 2022-07-18

## 2022-07-18 DIAGNOSIS — D86.0 SARCOIDOSIS OF LUNG (HCC): ICD-10-CM

## 2022-07-18 DIAGNOSIS — E11.9 TYPE 2 DIABETES MELLITUS WITHOUT COMPLICATION, WITHOUT LONG-TERM CURRENT USE OF INSULIN (HCC): Primary | ICD-10-CM

## 2022-07-18 NOTE — TELEPHONE ENCOUNTER
Has appointment on August 25th for a physical   Needs a lab order for [facility] and also a order for a chest xray

## 2022-07-27 ENCOUNTER — HOSPITAL ENCOUNTER (OUTPATIENT)
Dept: RADIOLOGY | Facility: HOSPITAL | Age: 37
Discharge: HOME | End: 2022-07-27
Attending: INTERNAL MEDICINE
Payer: COMMERCIAL

## 2022-07-27 DIAGNOSIS — D86.0 SARCOIDOSIS OF LUNG (HCC): ICD-10-CM

## 2022-07-27 PROCEDURE — 71046 X-RAY EXAM CHEST 2 VIEWS: CPT

## 2022-08-18 LAB
ALBUMIN SERPL-MCNC: 4.8 G/DL (ref 3.6–5.1)
ALBUMIN/GLOB SERPL: 1.8 (CALC) (ref 1–2.5)
ALP SERPL-CCNC: 74 U/L (ref 36–130)
ALT SERPL-CCNC: 43 U/L (ref 9–46)
AST SERPL-CCNC: 21 U/L (ref 10–40)
BILIRUB SERPL-MCNC: 0.9 MG/DL (ref 0.2–1.2)
BUN SERPL-MCNC: 18 MG/DL (ref 7–25)
BUN/CREAT SERPL: ABNORMAL (CALC) (ref 6–22)
CALCIUM SERPL-MCNC: 9.5 MG/DL (ref 8.6–10.3)
CHLORIDE SERPL-SCNC: 98 MMOL/L (ref 98–110)
CHOLEST SERPL-MCNC: 173 MG/DL
CHOLEST/HDLC SERPL: 4 (CALC)
CO2 SERPL-SCNC: 28 MMOL/L (ref 20–32)
CREAT SERPL-MCNC: 1.17 MG/DL (ref 0.6–1.26)
ERYTHROCYTE [DISTWIDTH] IN BLOOD BY AUTOMATED COUNT: 12.6 % (ref 11–15)
GFR/BSA.PRED SERPLBLD CYS-BASED-ARV: 82 ML/MIN/1.73M2
GLOBULIN SER CALC-MCNC: 2.7 G/DL (CALC) (ref 1.9–3.7)
GLUCOSE SERPL-MCNC: 136 MG/DL (ref 65–99)
HCT VFR BLD AUTO: 49.6 % (ref 38.5–50)
HDLC SERPL-MCNC: 43 MG/DL
HGB BLD-MCNC: 16.5 G/DL (ref 13.2–17.1)
LDLC SERPL CALC-MCNC: 102 MG/DL (CALC)
MCH RBC QN AUTO: 28 PG (ref 27–33)
MCHC RBC AUTO-ENTMCNC: 33.3 G/DL (ref 32–36)
MCV RBC AUTO: 84.2 FL (ref 80–100)
NONHDLC SERPL-MCNC: 130 MG/DL (CALC)
PLATELET # BLD AUTO: 151 THOUSAND/UL (ref 140–400)
PMV BLD REES-ECKER: 11.1 FL (ref 7.5–12.5)
POTASSIUM SERPL-SCNC: 4.2 MMOL/L (ref 3.5–5.3)
PROT SERPL-MCNC: 7.5 G/DL (ref 6.1–8.1)
RBC # BLD AUTO: 5.89 MILLION/UL (ref 4.2–5.8)
SODIUM SERPL-SCNC: 136 MMOL/L (ref 135–146)
TRIGL SERPL-MCNC: 160 MG/DL
TSH SERPL-ACNC: 1.41 MIU/L (ref 0.4–4.5)
WBC # BLD AUTO: 5.5 THOUSAND/UL (ref 3.8–10.8)

## 2022-08-25 ENCOUNTER — OFFICE VISIT (OUTPATIENT)
Dept: FAMILY MEDICINE CLINIC | Facility: HOSPITAL | Age: 37
End: 2022-08-25
Payer: COMMERCIAL

## 2022-08-25 VITALS
WEIGHT: 231 LBS | HEART RATE: 73 BPM | OXYGEN SATURATION: 97 % | SYSTOLIC BLOOD PRESSURE: 122 MMHG | HEIGHT: 74 IN | BODY MASS INDEX: 29.65 KG/M2 | DIASTOLIC BLOOD PRESSURE: 78 MMHG

## 2022-08-25 DIAGNOSIS — Z00.00 ANNUAL PHYSICAL EXAM: ICD-10-CM

## 2022-08-25 DIAGNOSIS — E11.9 TYPE 2 DIABETES MELLITUS WITHOUT COMPLICATION, WITHOUT LONG-TERM CURRENT USE OF INSULIN (HCC): Primary | ICD-10-CM

## 2022-08-25 DIAGNOSIS — D86.0 SARCOIDOSIS OF LUNG (HCC): ICD-10-CM

## 2022-08-25 PROCEDURE — 99213 OFFICE O/P EST LOW 20 MIN: CPT | Performed by: INTERNAL MEDICINE

## 2022-08-25 PROCEDURE — 3078F DIAST BP <80 MM HG: CPT | Performed by: INTERNAL MEDICINE

## 2022-08-25 PROCEDURE — 3725F SCREEN DEPRESSION PERFORMED: CPT | Performed by: INTERNAL MEDICINE

## 2022-08-25 PROCEDURE — 99395 PREV VISIT EST AGE 18-39: CPT | Performed by: INTERNAL MEDICINE

## 2022-08-25 PROCEDURE — 3074F SYST BP LT 130 MM HG: CPT | Performed by: INTERNAL MEDICINE

## 2022-08-25 NOTE — PROGRESS NOTES
[address] [facility]IMARY [facility] [address]    NAME: Thompson Lopez  AGE: 44 y o  SEX: male  : 1985     DATE: 2022     Assessment and Plan:     Problem List Items Addressed This Visit        Endocrine    Type 2 diabetes mellitus without complication, without long-term current use of insulin ([facility] ) - Primary       Lab Results   Component Value Date    HGBA1C 6 5 (H) 2021       Patient has type 2 diabetes mellitus which is diet controlled  Will check his A1c  Fasting blood sugars 136  He denies symptoms of hyperglycemia  We discussed preventing complications from diabetes  He will see his ophthalmologist this year  Will check his urine microalbumin  Depending on A1c will consider starting metformin  Relevant Orders    Hemoglobin A1c (w/out EAG) ([facility] ONLY)    Microalbumin, Random Urine (W/Creatinine) ([facility] ONLY)       Respiratory    Sarcoidosis of lung ([facility] )     Patient has history of sarcoidosis of the lung  He has no respiratory symptoms and his lung examination is normal   Chest x-ray showed no nodules, lymphadenopathy  Will monitor him closely           Other Visit Diagnoses     Annual physical exam              Immunizations and preventive care screenings were discussed with patient today  Appropriate education was printed on patient's after visit summary  Counseling:  · Exercise: the importance of regular exercise/physical activity was discussed  Recommend exercise 3-5 times per week for at least 30 minutes  Depression Screening and Follow-up Plan: Patient was screened for depression during today's encounter  They screened negative with a PHQ-2 score of 0  Return in about 6 months (around 2023) for Recheck  Chief Complaint:     No chief complaint on file       History of Present Illness:     Adult Annual Physical   Patient here for a comprehensive physical exam  The patient reports problems - As above  Diet and Physical Activity  · Diet/Nutrition: diabetic diet  · Exercise: no formal exercise  Depression Screening  PHQ-2/9 Depression Screening    Little interest or pleasure in doing things: 0 - not at all  Feeling down, depressed, or hopeless: 0 - not at all  PHQ-2 Score: 0  PHQ-2 Interpretation: Negative depression screen       General Health  · Sleep: sleeps well  · Hearing: normal - bilateral   · Vision: vision problems: Slightly worse vision in the eyes  He is going to see his ophthalmologist    · Dental: regular dental visits  [facility]  · History of STDs?: no      Review of Systems:     Review of Systems   Constitutional: Negative for appetite change, fever and unexpected weight change  HENT: Negative for hearing loss  Eyes: Negative for visual disturbance  Respiratory: Negative for cough, shortness of breath and wheezing  Cardiovascular: Negative for chest pain and palpitations  Gastrointestinal: Negative for abdominal pain, blood in stool and constipation  Genitourinary: Negative for difficulty urinating and hematuria  Musculoskeletal: Negative for arthralgias  Skin: Negative for rash  Neurological: Negative for weakness, numbness and headaches  Hematological: Negative for adenopathy  Psychiatric/Behavioral: Negative for dysphoric mood  Past Medical History:     History reviewed  No pertinent past medical history     Past Surgical History:     Past Surgical History:   Procedure Laterality Date    INCISION TENDON SHEATH Right     Radial styloid     KNEE ARTHROSCOPY      DC BRONCHOSCOPY NEEDLE BX TRACHEA MAIN STEM&/BRON N/A 8/23/2017    Procedure: EBUS WITH BRONCHOSCOPY;  Surgeon: Devan Sow DO;  [facility]: [facility];  Service: Pulmonary      Social History:     Social History     Socioeconomic History    Marital status: /Civil Union     Spouse name: None    Number of children: None    Years of education: None    Highest education level: None   Occupational History    Occupation: Self-employed    Tobacco Use    Smoking status: Never Smoker    Smokeless tobacco: Never Used   Vaping Use    Vaping Use: Never used   Substance and Sexual Activity    Alcohol use: Not Currently     Comment: Social    Drug use: No    Sexual activity: Yes   Other Topics Concern    None   Social History Narrative    Dental care, occasionally    Full-time employment    Lives with spouse    Living situation: Supportive and safe    No advance directives      Social Determinants of Health     Financial Resource Strain: Not on file   Food Insecurity: Not on file   Transportation Needs: Not on file   Physical Activity: Not on file   Stress: Not on file   Social Connections: Not on file   Intimate Partner Violence: Not on file   Housing Stability: Not on file      Family History:     Family History   Problem Relation Age of Onset    Diabetes Father     Coronary artery disease Father     Other Father     Heart disease Father     Kidney failure Father     Diabetes Maternal Grandmother     Bone cancer Maternal Grandmother     Lung cancer Maternal Grandfather     Prostate cancer Maternal Uncle       Current Medications:     Current Outpatient Medications   Medication Sig Dispense Refill    Multiple Vitamins-Minerals (MULTIVITAMIN ADULT PO) Take 1 tablet by mouth daily      Omega-3 1000 MG CAPS Take 2 capsules by mouth daily       No current facility-administered medications for this visit  Allergies:     No Known Allergies   Physical Exam:     /78   Pulse 73   Ht 6' 1 5" (1 867 m)   Wt 105 kg (231 lb)   SpO2 97%   BMI 30 06 kg/m²     Physical Exam  Constitutional:       Appearance: He is well-developed  HENT:      Head: Normocephalic  Right Ear: Tympanic membrane normal  There is no impacted cerumen  Left Ear: Tympanic membrane normal  There is no impacted cerumen     Eyes:      Conjunctiva/sclera: Conjunctivae normal    Cardiovascular:      Rate and Rhythm: Normal rate and regular rhythm  Pulses: no weak pulses          Dorsalis pedis pulses are 2+ on the right side and 2+ on the left side  Posterior tibial pulses are 2+ on the right side and 2+ on the left side  Heart sounds: No murmur heard  Pulmonary:      Effort: No respiratory distress  Breath sounds: No wheezing or rales  Abdominal:      General: Bowel sounds are normal       Palpations: Abdomen is soft  There is no mass  Tenderness: There is no abdominal tenderness  Musculoskeletal:         General: No tenderness  Cervical back: Neck supple  Feet:      Right foot:      Skin integrity: No ulcer  Left foot:      Skin integrity: No ulcer  Lymphadenopathy:      Cervical: No cervical adenopathy  Skin:     General: Skin is warm and dry  Findings: No erythema  Neurological:      Mental Status: He is alert and oriented to person, place, and time  Cranial Nerves: No cranial nerve deficit  Motor: No weakness  Gait: Gait normal    Psychiatric:         Mood and Affect: Mood normal          Thought Content: Thought content normal       Diabetic Foot Exam    Patient's shoes and socks removed  Right Foot/Ankle   Right Foot Inspection  Skin Exam: No ulcer  Sensory   Monofilament testing: intact    Vascular  The right DP pulse is 2+  The right PT pulse is 2+  Left Foot/Ankle  Left Foot Inspection  Skin Exam: No ulcer  Sensory   Monofilament testing: intact    Vascular  The left DP pulse is 2+  The left PT pulse is 2+       Assign Risk Category  No deformity present  No loss of protective sensation  No weak pulses  Risk: 0      King Lea MD   [address] [facility] CARE [facility]

## 2022-08-25 NOTE — ASSESSMENT & PLAN NOTE
Patient has history of sarcoidosis of the lung  He has no respiratory symptoms and his lung examination is normal   Chest x-ray showed no nodules, lymphadenopathy    Will monitor him closely

## 2022-08-25 NOTE — PATIENT INSTRUCTIONS

## 2022-08-25 NOTE — ASSESSMENT & PLAN NOTE
Lab Results   Component Value Date    HGBA1C 6 5 (H) 07/07/2021       Patient has type 2 diabetes mellitus which is diet controlled  Will check his A1c  Fasting blood sugars 136  He denies symptoms of hyperglycemia  We discussed preventing complications from diabetes  He will see his ophthalmologist this year  Will check his urine microalbumin  Depending on A1c will consider starting metformin

## 2022-09-19 LAB
LEFT EYE DIABETIC RETINOPATHY: POSITIVE
RIGHT EYE DIABETIC RETINOPATHY: POSITIVE

## 2022-12-14 ENCOUNTER — OFFICE VISIT (OUTPATIENT)
Dept: URGENT CARE | Facility: CLINIC | Age: 37
End: 2022-12-14

## 2022-12-14 VITALS
SYSTOLIC BLOOD PRESSURE: 148 MMHG | HEART RATE: 78 BPM | TEMPERATURE: 98.7 F | OXYGEN SATURATION: 98 % | HEIGHT: 74 IN | RESPIRATION RATE: 16 BRPM | DIASTOLIC BLOOD PRESSURE: 92 MMHG | BODY MASS INDEX: 30.16 KG/M2 | WEIGHT: 235 LBS

## 2022-12-14 DIAGNOSIS — S91.332A PUNCTURE WOUND OF LEFT FOOT, INITIAL ENCOUNTER: Primary | ICD-10-CM

## 2022-12-14 RX ORDER — CEPHALEXIN 500 MG/1
500 CAPSULE ORAL EVERY 8 HOURS SCHEDULED
Qty: 21 CAPSULE | Refills: 0 | Status: SHIPPED | OUTPATIENT
Start: 2022-12-14 | End: 2022-12-21

## 2022-12-14 NOTE — PATIENT INSTRUCTIONS
Patient was educated on left foot puncture wound  Patient was prescribed antibiotic  Patient was told to eat on antibiotics  Tdap was updated  Patient was educated on signs of infection  These include- redness, purulent discharge and high grade fevers    Puncture Wound   WHAT YOU NEED TO KNOW:   A puncture wound is a hole in the skin made by a sharp, pointed object  The area may be bruised or swollen  You may have bleeding, pain, or trouble moving the affected area  DISCHARGE INSTRUCTIONS:   Return to the emergency department if:   You have severe pain  You have numbness or tingling in the area of your wound  Your wound starts bleeding and does not stop, even after you apply pressure  Call your doctor if:   You have new drainage or a bad odor coming from the wound  You have a fever or chills  You have increased swelling, redness, or pain  You have red streaks on your skin coming from your wound  You have questions or concerns about your condition or care  Medicines: You may need any of the following:  NSAIDs , such as ibuprofen, help decrease swelling, pain, and fever  This medicine is available with or without a doctor's order  NSAIDs can cause stomach bleeding or kidney problems in certain people  If you take blood thinner medicine, always ask your healthcare provider if NSAIDs are safe for you  Always read the medicine label and follow directions  Antibiotics  help prevent a bacterial infection  Take your medicine as directed  Contact your healthcare provider if you think your medicine is not helping or if you have side effects  Tell him of her if you are allergic to any medicine  Keep a list of the medicines, vitamins, and herbs you take  Include the amounts, and when and why you take them  Bring the list or the pill bottles to follow-up visits  Carry your medicine list with you in case of an emergency  Care for your wound as directed:  Keep your wound clean and dry  When you are allowed to bathe, carefully wash the wound with soap and water  Dry the area and put on new, clean bandages as directed  Change your bandages when they get wet or dirty  Rest and elevate  the injured area above the level of your heart as often as you can  This will help decrease swelling and pain  Prop your injured area on pillows or blankets to keep it elevated comfortably  Follow up with your doctor in 2 to 3 days:  Write down your questions so you remember to ask them during your visits  © Copyright [employer] 2022 Information is for End User's use only and may not be sold, redistributed or otherwise used for commercial purposes  All illustrations and images included in CareNotes® are the copyrighted property of A D A M , Inc  or [facility] Eduarda Frank   The above information is an  only  It is not intended as medical advice for individual conditions or treatments  Talk to your doctor, nurse or pharmacist before following any medical regimen to see if it is safe and effective for you

## 2022-12-14 NOTE — PROGRESS NOTES
[address]        NAME: Faiza Tate is a 44 y o  male  : 1985    MRN: [id number]  DATE: 2022  TIME: 12:46 PM    Assessment and Plan   Puncture wound of left foot, initial encounter [S91 332A]  1  Puncture wound of left foot, initial encounter  Tdap Vaccine greater than or equal to 6yo    cephalexin (KEFLEX) 500 mg capsule        TDAP updated    Declined left foot Xray at this time      Puncture wound was cleaned with wound cleanser  Patient Instructions       Patient was educated on left foot puncture wound  Patient was prescribed antibiotic  Patient was told to eat on antibiotics  Tdap was updated  Patient was educated on signs of infection  These include- redness, purulent discharge and high grade fevers    Chief Complaint     Chief Complaint   Patient presents with   • Skin Problem     Pt reports he stepped on a nail today  Reports nail punctured sneak into left foot  Last tetanus   History of Present Illness       Patient reports he stepped on a [place] nail with shoes on at 9:30 am  Patient reports no bleeding after injury  Patient has no allergies  Admits history of diabetes  Patient had last Tetanus in   Patient would like Tetanus updated  Review of Systems   Review of Systems   Constitutional: Negative  Respiratory: Negative  Cardiovascular: Negative      Musculoskeletal:        Left foot puncture wound         Current Medications       Current Outpatient Medications:   •  cephalexin (KEFLEX) 500 mg capsule, Take 1 capsule (500 mg total) by mouth every 8 (eight) hours for 7 days, Disp: 21 capsule, Rfl: 0  •  Multiple Vitamins-Minerals (MULTIVITAMIN ADULT PO), Take 1 tablet by mouth daily, Disp: , Rfl:   •  Omega-3 1000 MG CAPS, Take 2 capsules by mouth daily, Disp: , Rfl:     Current Allergies     Allergies as of 2022   • (No Known Allergies)            The following portions of the patient's history were reviewed and updated as appropriate: allergies, current medications, past family history, past medical history, past social history, past surgical history and problem list      History reviewed  No pertinent past medical history  Past Surgical History:   Procedure Laterality Date   • INCISION TENDON SHEATH Right     Radial styloid    • KNEE ARTHROSCOPY     • HI BRONCHOSCOPY NEEDLE BX TRACHEA MAIN STEM&/BRON N/A 8/23/2017    Procedure: EBUS WITH BRONCHOSCOPY;  Surgeon: Aida Tang DO;  [facility];  Service: Pulmonary       Family History   Problem Relation Age of Onset   • Diabetes Father    • Coronary artery disease Father    • Other Father    • Heart disease Father    • Kidney failure Father    • Diabetes Maternal Grandmother    • Bone cancer Maternal Grandmother    • Lung cancer Maternal Grandfather    • Prostate cancer Maternal Uncle          Medications have been verified  Objective   /92   Pulse 78   Temp 98 7 °F (37 1 °C)   Resp 16   Ht 6' 2" (1 88 m)   Wt 107 kg (235 lb)   SpO2 98%   BMI 30 17 kg/m²   No LMP for male patient  Physical Exam     Physical Exam  Vitals and nursing note reviewed  Constitutional:       Appearance: Normal appearance  HENT:      Head: Normocephalic  Cardiovascular:      Rate and Rhythm: Normal rate and regular rhythm  Heart sounds: Normal heart sounds  Pulmonary:      Breath sounds: Normal breath sounds  No wheezing  Skin:     Comments: Small puncture wound on bottom of left foot  NO redness or purulent discharge  Sensation intact in left foot  Neurological:      General: No focal deficit present  Mental Status: He is alert and oriented to person, place, and time     Psychiatric:         Mood and Affect: Mood normal          Behavior: Behavior normal

## 2023-02-21 ENCOUNTER — RA CDI HCC (OUTPATIENT)
Dept: OTHER | Facility: HOSPITAL | Age: 38
End: 2023-02-21

## 2023-02-21 NOTE — PROGRESS NOTES
[facility]  coding opportunities       Chart reviewed, no opportunity found: CHART REVIEWED, NO OPPORTUNITY FOUND        Patients Insurance        Commercial Insurance: [employer]

## 2023-03-24 ENCOUNTER — TELEPHONE (OUTPATIENT)
Dept: FAMILY MEDICINE CLINIC | Facility: HOSPITAL | Age: 38
End: 2023-03-24

## 2023-03-24 DIAGNOSIS — E11.9 TYPE 2 DIABETES MELLITUS WITHOUT COMPLICATION, WITHOUT LONG-TERM CURRENT USE OF INSULIN (HCC): Primary | ICD-10-CM

## 2023-03-24 NOTE — TELEPHONE ENCOUNTER
Patient went to [facility] today to have labs done for appointment (4/4/23)  These were mistakenly omitted and Dr Jonah Maldonado wanted these done (A1C, urine)  No orders in chart for patient  Would Dr Jonah Maldonado put in orders for this including any others that he may want for patient's upcoming appointment? Patient would like to get this done asap

## 2023-03-27 ENCOUNTER — RA CDI HCC (OUTPATIENT)
Dept: OTHER | Facility: HOSPITAL | Age: 38
End: 2023-03-27

## 2023-03-29 LAB
ALBUMIN SERPL-MCNC: 4.8 G/DL (ref 3.6–5.1)
ALBUMIN/CREAT UR: 28 MCG/MG CREAT
ALBUMIN/GLOB SERPL: 2.3 (CALC) (ref 1–2.5)
ALP SERPL-CCNC: 63 U/L (ref 36–130)
ALT SERPL-CCNC: 40 U/L (ref 9–46)
AST SERPL-CCNC: 19 U/L (ref 10–40)
BILIRUB SERPL-MCNC: 1.1 MG/DL (ref 0.2–1.2)
BUN SERPL-MCNC: 27 MG/DL (ref 7–25)
BUN/CREAT SERPL: 24 (CALC) (ref 6–22)
CALCIUM SERPL-MCNC: 9.8 MG/DL (ref 8.6–10.3)
CHLORIDE SERPL-SCNC: 102 MMOL/L (ref 98–110)
CHOLEST SERPL-MCNC: 191 MG/DL
CHOLEST/HDLC SERPL: 4.4 (CALC)
CO2 SERPL-SCNC: 29 MMOL/L (ref 20–32)
CREAT SERPL-MCNC: 1.14 MG/DL (ref 0.6–1.26)
CREAT UR-MCNC: 155 MG/DL (ref 20–320)
ERYTHROCYTE [DISTWIDTH] IN BLOOD BY AUTOMATED COUNT: 12.7 % (ref 11–15)
GFR/BSA.PRED SERPLBLD CYS-BASED-ARV: 84 ML/MIN/1.73M2
GLOBULIN SER CALC-MCNC: 2.1 G/DL (CALC) (ref 1.9–3.7)
GLUCOSE SERPL-MCNC: 138 MG/DL (ref 65–99)
HBA1C MFR BLD: 7.2 % OF TOTAL HGB
HCT VFR BLD AUTO: 47.4 % (ref 38.5–50)
HDLC SERPL-MCNC: 43 MG/DL
HGB BLD-MCNC: 16.7 G/DL (ref 13.2–17.1)
LDLC SERPL CALC-MCNC: 119 MG/DL (CALC)
MCH RBC QN AUTO: 29.2 PG (ref 27–33)
MCHC RBC AUTO-ENTMCNC: 35.2 G/DL (ref 32–36)
MCV RBC AUTO: 83 FL (ref 80–100)
MICROALBUMIN UR-MCNC: 4.4 MG/DL
NONHDLC SERPL-MCNC: 148 MG/DL (CALC)
PLATELET # BLD AUTO: 196 THOUSAND/UL (ref 140–400)
PMV BLD REES-ECKER: 10.9 FL (ref 7.5–12.5)
POTASSIUM SERPL-SCNC: 4.4 MMOL/L (ref 3.5–5.3)
PROT SERPL-MCNC: 6.9 G/DL (ref 6.1–8.1)
RBC # BLD AUTO: 5.71 MILLION/UL (ref 4.2–5.8)
SODIUM SERPL-SCNC: 138 MMOL/L (ref 135–146)
TRIGL SERPL-MCNC: 170 MG/DL
TSH SERPL-ACNC: 1.07 MIU/L (ref 0.4–4.5)
WBC # BLD AUTO: 4.9 THOUSAND/UL (ref 3.8–10.8)

## 2023-04-04 ENCOUNTER — OFFICE VISIT (OUTPATIENT)
Dept: FAMILY MEDICINE CLINIC | Facility: HOSPITAL | Age: 38
End: 2023-04-04

## 2023-04-04 VITALS
SYSTOLIC BLOOD PRESSURE: 136 MMHG | HEIGHT: 74 IN | HEART RATE: 78 BPM | WEIGHT: 231 LBS | DIASTOLIC BLOOD PRESSURE: 75 MMHG | RESPIRATION RATE: 16 BRPM | OXYGEN SATURATION: 97 % | BODY MASS INDEX: 29.65 KG/M2

## 2023-04-04 DIAGNOSIS — E11.9 TYPE 2 DIABETES MELLITUS WITHOUT COMPLICATION, WITHOUT LONG-TERM CURRENT USE OF INSULIN (HCC): Primary | ICD-10-CM

## 2023-04-04 DIAGNOSIS — D86.0 SARCOIDOSIS OF LUNG (HCC): ICD-10-CM

## 2023-04-04 NOTE — ASSESSMENT & PLAN NOTE
Patient has history of pulmonary sarcoidosis  He denies cough, shortness of breath, wheezing, change in vision, joint swelling, rash  Lungs okay to auscultation without crackles or wheezing  Chest x-ray last July showed no mediastinal lymphadenopathy or nodules      I will monitor patient closely

## 2023-04-04 NOTE — ASSESSMENT & PLAN NOTE
Lab Results   Component Value Date    HGBA1C 7 2 (H) 03/28/2023       Patient has type 2 diabetes mellitus  His A1c increased compared to last year  He denies any vision changes and he had no retinopathy in 2021 on review of records  Urine microalbumin was normal last month and he denies any symptoms of neuropathy  Monofilament testing of the feet was normal today    We discussed diet and increasing physical activity to improve blood sugar control before starting patient on a medication like metformin  I will check his help and A1c in 3 months  If diet and lifestyle changes blood work then we will start patient metformin in 3 months

## 2023-04-04 NOTE — PATIENT INSTRUCTIONS
Meal Planning with Diabetes Exchanges   AMBULATORY CARE:   Diabetes exchanges  are servings of food that contain similar amounts of carbohydrate, fat, protein, and calories within a food group  The exchanges can be used to develop a healthy meal plan that helps to keep your blood sugar within the recommended levels  A meal plan with the right amount of carbohydrates is especially important  Your blood sugar naturally rises after you eat carbohydrates  Too many carbohydrates in 1 meal or snack can raise your blood sugar level  Carbohydrates are found in starches, fruit, milk, yogurt, and sweets  Call your doctor or diabetes care provider if:   You have high blood sugar levels during a certain time of day, or almost all of the time  You often have low blood sugar levels  You have questions or concerns about your condition or care  Create a meal plan with exchanges:  A dietitian will work with you to develop a healthy meal plan that is right for you  This meal plan will include the amount of exchanges you can have from each food group throughout the day  Follow your meal plan by keeping track of the amount of exchanges you eat for each meal and snack  Your meal plan will be based on your age, weight, blood sugar levels, medicine, and activity level  Starch food group exchanges:  Each exchange below contains about 15 grams of carbohydrate , 3 grams of protein, 1 gram of fat, and 80 calories  1 ounce of white, whole wheat or rye bread (1 slice)    1 ounce of bagel (about ¼ of a bagel)    1 6-inch flour or corn tortilla or 1 4-inch pancake (about ¼ inch thick)    ?  cup of cooked pasta or rice    ¾ cup of dry, ready-to-eat cereal with no sugar added    ½ cup of cooked cereal, such as oatmeal    3 maryjo cracker squares or 8 animal crackers    6 saltine-type crackers    3 cups of popcorn or ¾ ounce of pretzels    Starchy vegetables and cooked legumes:      ½ cup of corn, green peas, sweet potatoes, or mashed potatoes    ¼ of a large baked potato    1 cup of acorn, butternut squash, or pumpkin    ½ cup of beans, lentils, or peas (such as goldsmith, kidney, or black-eyed)    ? cup of lima beans    Fruit group exchanges:  Each exchange contains about 15 grams of carbohydrate  and 60 calories  1 small (4 ounce) apple, banana orange, or nectarine    ½ cup of canned or fresh fruit    ½ cup (4 ounces) of unsweetened fruit juice    2 tablespoons of dried fruit    Milk group exchanges:  Each exchange contains about 12 grams of carbohydrate  and 8 grams of protein  The amount of fat and calories in each serving depends on the type of milk (such as whole, low-fat, or fat-free)  1 cup fat-free or low-fat milk    ¾ cup of plain, nonfat yogurt    1 cup fat-free, flavored yogurt with artificial (no calorie) sweetener    Non-starchy vegetable group exchanges:  Each exchange contains about 5 grams of carbohydrate , 2 grams of protein, and 25 calories  Examples include beets, broccoli, cabbage, carrots, cauliflower, cucumber, mushrooms, tomatoes, and zucchini  ½ cup of cooked vegetables or 1 cup of raw vegetables    ½ cup of vegetable juice    Meat and meat substitute group exchanges:  Each exchange of a lean meat  listed below contains about 7 grams of protein, 0 to 3 grams of fat, and 45 calories  The meat and meat substitutes food group does not contain any carbohydrates  Medium and high-fat meats have more calories  1 ounce of chicken or turkey without skin, or 1 ounce of fish (not breaded or fried)    1 ounce of lean beef, pork, or lamb    1-inch cube or 1 ounce of low-fat cheese    2 egg whites or ¼ cup of egg substitute    ½ cup of tofu    Sweets, desserts, and other carbohydrate group exchanges:   Sweets and other desserts:  Each exchange has about 15 grams of carbohydrate       1 ounce of álvaro food cake or 2-inch square cake (unfrosted)    2 small cookies    ½ cup of sugar-free, fat-free ice cream    1 tablespoon of syrup, jam, jelly, table sugar, or honey    Combination foods:     1 cup of an entrée, such as lasagna, spaghetti with meatballs, macaroni and cheese, and chili with beans (each serving counts as 2 carbohydrate exchanges )    1 cup of tomato or vegetable beef soup (each serving counts as 1 carbohydrate exchange )    Fat group exchanges:  Each exchange contains 5 grams of fat and 45 calories  1 teaspoon of oil (such as canola, olive, or corn oil)    6 almonds or cashews, 10 peanuts, or 4 pecan halves    2 tablespoons of avocado    ½ tablespoon of peanut butter    1 teaspoon of regular margarine or 2 teaspoons of low-fat margarine    1 teaspoon of regular butter or 1 tablespoon of low-fat butter    1 teaspoon of regular mayonnaise or 1 tablespoon of low-fat mayonnaise    1 tablespoon of regular salad dressing or 2 tablespoons of low-fat salad dressing    Free foods: The foods on this list are called free foods because they have very few calories  Free foods usually do not increase your blood sugar if you limit them  1 tablespoon of catsup or taco sauce    ¼ cup of salsa    2 tablespoons of sugar-free syrup or 2 teaspoons of light jam or jelly    1 tablespoon of fat-free salad dressing    4 tablespoons of fat-free margarine or fat-free mayonnaise    Sugar-free drinks: diet soda, sugar-free drink mixes, or mineral water    Low-sodium bouillon or fat-free broth    Mustard    Seasonings such as spices, herbs, and garlic    Sugar-free gelatin without added fruit    Other healthy nutrition guidelines:   Limit drinks with sugar substitutes  Your dietitian or healthcare provider will encourage you to drink water  Water helps your kidneys to function properly  Ask how much water you should drink every day  Eat more fiber  Choose foods that are good sources of fiber, such as fruits, vegetables, and whole grains  Cereals that contain 5 or more grams of fiber per serving are good sources of fiber   Legumes such as garrupal diggs beans, kidney beans, and lentils are also good sources  Limit fat  Ask your dietitian or healthcare provider how much fat you should eat each day  Choose foods low in fat, saturated fat, trans fat, and cholesterol  Examples include turkey or chicken without the skin, fish, lean cuts of meat, and beans  Low-fat dairy foods, such as low-fat or fat-free milk and low-fat yogurt are also good choices  Omega-3 fatty acids are healthy fats that are found in canola oil, soybean oil and fatty fish  [place], albacore tuna, and sardines are good sources of omega 3 fatty acids  Eat 2 servings of these types of fish each week  Do not eat fried fish  Limit sugar  Sugar and sweets must be counted toward the carbohydrate exchanges that you can have within your meal plan  Limit sugar and sweets because they are usually also high in calories and fat  Eat smaller portions of sweets by sharing a dessert or asking for a child-size portion at a restaurant  Limit sodium  (salt) to about 2,300 mg per day  You may need to eat even less sodium if you have certain medical conditions  Foods high in sodium include soy sauce, potato chips, and soup  Limit alcohol  Ask your healthcare provider if it is safe for you to drink alcohol  If alcohol is safe for you to have, eat a meal when you drink alcohol  If you drink alcohol on an empty stomach, your blood sugar may drop to a low level  Women 21 years or older and men 72 years or older should limit alcohol to 1 drink a day  Men aged 24 to 59 years should limit alcohol to 2 drinks a day  A drink of alcohol is 5 ounces of wine, 12 ounces of beer, or 1½ ounces of liquor  Other ways to manage diabetes:   Control your blood sugar level  Test your blood sugar level regularly and keep a record of the results  Ask your healthcare provider when and how often to test your blood sugar  You may need to check your blood sugar level at least 3 times each day      Talk to your healthcare provider about your weight  Ask if you need to lose weight, and how much you need to lose  If you are overweight, you may need to make other changes to lose weight  Ask your healthcare provider to help you create a weight loss program     Get regular physical activity  Physical activity can help decrease your blood sugar level  It can also help to decrease your risk for heart disease and help you lose weight  Adults should have moderate intensity physical activity for at least 150 minutes every week  Spread the amount of activity over at least 3 days a week  Do not skip more than 2 days in a row  Children should get at least 60 minutes of moderate physical activity on most days of the week  Examples of moderate physical activity include brisk walking, running, and swimming  Do not sit for longer than 30 minutes  Work with your healthcare provider to create a plan for physical activity  © Copyright [employer] 2022 Information is for End User's use only and may not be sold, redistributed or otherwise used for commercial purposes  The above information is an  only  It is not intended as medical advice for individual conditions or treatments  Talk to your doctor, nurse or pharmacist before following any medical regimen to see if it is safe and effective for you

## 2023-04-04 NOTE — PROGRESS NOTES
Assessment/Plan:    Type 2 diabetes mellitus without complication, without long-term current use of insulin (HCC)    Lab Results   Component Value Date    HGBA1C 7 2 (H) 03/28/2023       Patient has type 2 diabetes mellitus  His A1c increased compared to last year  He denies any vision changes and he had no retinopathy in 2021 on review of records  Urine microalbumin was normal last month and he denies any symptoms of neuropathy  Monofilament testing of the feet was normal today    We discussed diet and increasing physical activity to improve blood sugar control before starting patient on a medication like metformin  I will check his help and A1c in 3 months  If diet and lifestyle changes blood work then we will start patient metformin in 3 months  Sarcoidosis of lung [facility])  Patient has history of pulmonary sarcoidosis  He denies cough, shortness of breath, wheezing, change in vision, joint swelling, rash  Lungs okay to auscultation without crackles or wheezing  Chest x-ray last July showed no mediastinal lymphadenopathy or nodules  I will monitor patient closely       Diagnoses and all orders for this visit:    Type 2 diabetes mellitus without complication, without long-term current use of insulin (HCC)  -     Hemoglobin A1c (w/out EAG) ([facility] ONLY); Future  -     Lipid Panel with Direct LDL reflex; Future  -     Comprehensive metabolic panel; Future  -     Lipid Panel with Direct LDL reflex  -     Comprehensive metabolic panel    Sarcoidosis of lung (HCC)          Subjective:      Patient ID: Eusebio Cavanaugh is a 43 y o  male  HPI    Patient presents for follow-up of chronic conditions as detailed in the assessment and plan        The following portions of the patient's history were reviewed and updated as appropriate: current medications, past family history, past medical history, past social history, past surgical history and problem list     Review of Systems      Objective:    /75 "  Pulse 78   Resp 16   Ht 6' 2\" (1 88 m)   Wt 105 kg (231 lb)   SpO2 97%   BMI 29 66 kg/m²      Physical Exam  Constitutional:       General: He is not in acute distress  Appearance: He is not toxic-appearing  Eyes:      Conjunctiva/sclera: Conjunctivae normal    Cardiovascular:      Rate and Rhythm: Normal rate and regular rhythm  Pulses: no weak pulses          Dorsalis pedis pulses are 2+ on the right side and 2+ on the left side  Posterior tibial pulses are 2+ on the right side and 2+ on the left side  Heart sounds: No murmur heard  Pulmonary:      Effort: No respiratory distress  Breath sounds: No wheezing or rales  Abdominal:      General: Bowel sounds are normal    Feet:      Right foot:      Skin integrity: No ulcer  Left foot:      Skin integrity: No ulcer  Skin:     General: Skin is warm and dry  Comments: Patient has a soft tissue mass on the dorsum of the right foot  Ganglion cyst?   Neurological:      Mental Status: He is alert and oriented to person, place, and time  Cranial Nerves: No cranial nerve deficit  Motor: No weakness  Psychiatric:         Mood and Affect: Mood normal          Thought Content: Thought content normal          Diabetic Foot Exam    Patient's shoes and socks removed  Right Foot/Ankle   Right Foot Inspection  Skin Exam: No ulcer  Sensory   Monofilament testing: intact    Vascular  The right DP pulse is 2+  The right PT pulse is 2+  Left Foot/Ankle  Left Foot Inspection  Skin Exam: No ulcer  Sensory   Monofilament testing: intact    Vascular  The left DP pulse is 2+  The left PT pulse is 2+  Assign Risk Category  No deformity present  No loss of protective sensation  No weak pulses  Risk: 0    BMI Counseling: Body mass index is 29 66 kg/m²  The BMI is above normal  Nutrition recommendations include decreasing portion sizes and encouraging healthy choices of fruits and vegetables   Exercise " recommendations include moderate physical activity 150 minutes/week  Rationale for BMI follow-up plan is due to patient being overweight or obese  Depression Screening and Follow-up Plan: Patient was screened for depression during today's encounter  They screened negative with a PHQ-2 score of 0          Medina Gooden MD

## 2023-06-30 LAB
ALBUMIN SERPL-MCNC: 4.9 G/DL (ref 3.6–5.1)
ALBUMIN/GLOB SERPL: 2.1 (CALC) (ref 1–2.5)
ALP SERPL-CCNC: 62 U/L (ref 36–130)
ALT SERPL-CCNC: 40 U/L (ref 9–46)
AST SERPL-CCNC: 19 U/L (ref 10–40)
BILIRUB SERPL-MCNC: 1.3 MG/DL (ref 0.2–1.2)
BUN SERPL-MCNC: 24 MG/DL (ref 7–25)
BUN/CREAT SERPL: ABNORMAL (CALC) (ref 6–22)
CALCIUM SERPL-MCNC: 9.7 MG/DL (ref 8.6–10.3)
CHLORIDE SERPL-SCNC: 101 MMOL/L (ref 98–110)
CHOLEST SERPL-MCNC: 197 MG/DL
CHOLEST/HDLC SERPL: 3.5 (CALC)
CO2 SERPL-SCNC: 30 MMOL/L (ref 20–32)
CREAT SERPL-MCNC: 1.11 MG/DL (ref 0.6–1.26)
GFR/BSA.PRED SERPLBLD CYS-BASED-ARV: 87 ML/MIN/1.73M2
GLOBULIN SER CALC-MCNC: 2.3 G/DL (CALC) (ref 1.9–3.7)
GLUCOSE SERPL-MCNC: 126 MG/DL (ref 65–99)
HBA1C MFR BLD: 6.6 % OF TOTAL HGB
HDLC SERPL-MCNC: 56 MG/DL
LDLC SERPL CALC-MCNC: 125 MG/DL (CALC)
NONHDLC SERPL-MCNC: 141 MG/DL (CALC)
POTASSIUM SERPL-SCNC: 4.4 MMOL/L (ref 3.5–5.3)
PROT SERPL-MCNC: 7.2 G/DL (ref 6.1–8.1)
SODIUM SERPL-SCNC: 138 MMOL/L (ref 135–146)
TRIGL SERPL-MCNC: 72 MG/DL

## 2023-07-12 ENCOUNTER — OFFICE VISIT (OUTPATIENT)
Dept: FAMILY MEDICINE CLINIC | Facility: HOSPITAL | Age: 38
End: 2023-07-12
Payer: COMMERCIAL

## 2023-07-12 VITALS
WEIGHT: 219 LBS | RESPIRATION RATE: 16 BRPM | DIASTOLIC BLOOD PRESSURE: 78 MMHG | BODY MASS INDEX: 28.11 KG/M2 | HEIGHT: 74 IN | HEART RATE: 74 BPM | OXYGEN SATURATION: 97 % | SYSTOLIC BLOOD PRESSURE: 148 MMHG

## 2023-07-12 DIAGNOSIS — E11.9 TYPE 2 DIABETES MELLITUS WITHOUT COMPLICATION, WITHOUT LONG-TERM CURRENT USE OF INSULIN (HCC): Primary | ICD-10-CM

## 2023-07-12 DIAGNOSIS — Z00.00 ANNUAL PHYSICAL EXAM: ICD-10-CM

## 2023-07-12 PROCEDURE — 99213 OFFICE O/P EST LOW 20 MIN: CPT | Performed by: INTERNAL MEDICINE

## 2023-07-12 PROCEDURE — 99395 PREV VISIT EST AGE 18-39: CPT | Performed by: INTERNAL MEDICINE

## 2023-07-12 NOTE — ASSESSMENT & PLAN NOTE
Lab Results   Component Value Date    HGBA1C 6.6 (H) 06/30/2023       Patient has type 2 diabetes mellitus without complication. He will see his ophthalmologist in September  His A1c is significantly improved after exercising 5 days a week and significantly decreasing carb intake. Continue lifestyle changes and diet to lower blood sugars.     I will recheck A1c and fasting blood sugar before next visit in the fall

## 2023-07-12 NOTE — PROGRESS NOTES
[address] [facility]  PRIMARY CARE [address]    NAME: Magno Jenkins  AGE: 43 y.o. SEX: male  : 1985     DATE: 2023     Assessment and Plan:     Problem List Items Addressed This Visit        Endocrine    Type 2 diabetes mellitus without complication, without long-term current use of insulin ([address]) - Primary       Lab Results   Component Value Date    HGBA1C 6.6 (H) 2023       Patient has type 2 diabetes mellitus without complication. He will see his ophthalmologist in September  His A1c is significantly improved after exercising 5 days a week and significantly decreasing carb intake. Continue lifestyle changes and diet to lower blood sugars. I will recheck A1c and fasting blood sugar before next visit in the fall         Relevant Orders    Comprehensive metabolic panel    Lipid Panel with Direct LDL reflex    Hemoglobin A1c (w/out EAG) ([facility] ONLY)   Other Visit Diagnoses     Annual physical exam              Immunizations and preventive care screenings were discussed with patient today. Appropriate education was printed on patient's after visit summary. Counseling:  Exercise: the importance of regular exercise/physical activity was discussed. Recommend exercise 3-5 times per week for at least 30 minutes. Return in about 4 months (around 2023) for Recheck. Chief Complaint:     No chief complaint on file. History of Present Illness:     Adult Annual Physical   Patient here for a comprehensive physical exam. The patient reports problems - Diabetes is improving. Diet and Physical Activity  Diet/Nutrition: diabetic diet. Exercise: vigorous cardiovascular exercise. Depression Screening  PHQ-2/9 Depression Screening         General Health  Sleep: sleeps well. Hearing: normal - bilateral.  Vision: no vision problems. Dental: regular dental visits.         Health  History of STDs?: no. Review of Systems:     Review of Systems   Constitutional: Negative for fever. HENT: Negative for hearing loss. Eyes: Negative for visual disturbance. Respiratory: Negative for cough and shortness of breath. Cardiovascular: Negative for chest pain. Gastrointestinal: Negative for abdominal pain, blood in stool and constipation. Genitourinary: Negative for difficulty urinating. Musculoskeletal: Negative for arthralgias. Skin: Negative for rash and wound. Neurological: Negative for numbness and headaches. Psychiatric/Behavioral: Negative for dysphoric mood. Past Medical History:     History reviewed. No pertinent past medical history.    Past Surgical History:     Past Surgical History:   Procedure Laterality Date   • INCISION TENDON SHEATH Right     Radial styloid    • KNEE ARTHROSCOPY     • NH BRONCHOSCOPY NEEDLE BX TRACHEA MAIN STEM&/BRON N/A 8/23/2017    Procedure: EBUS WITH BRONCHOSCOPY;  Surgeon: Caro Elias DO;  Location: [facility];  Service: Pulmonary      Social History:     Social History     Socioeconomic History   • Marital status: /Civil Union     Spouse name: None   • Number of children: None   • Years of education: None   • Highest education level: None   Occupational History   • Occupation: Self-employed    Tobacco Use   • Smoking status: Never   • Smokeless tobacco: Never   Vaping Use   • Vaping Use: Never used   Substance and Sexual Activity   • Alcohol use: Not Currently     Comment: Social   • Drug use: No   • Sexual activity: Yes   Other Topics Concern   • None   Social History Narrative    Dental care, occasionally    Full-time employment    Lives with spouse    Living situation: Supportive and safe    No advance directives      Social Determinants of Health     Financial Resource Strain: Not on file   Food Insecurity: Not on file   Transportation Needs: Not on file   Physical Activity: Not on file   Stress: Not on file   Social Connections: Not on file Intimate Partner Violence: Not on file   Housing Stability: Not on file      Family History:     Family History   Problem Relation Age of Onset   • Diabetes Father    • Coronary artery disease Father    • Other Father    • Heart disease Father    • Kidney failure Father    • Diabetes Maternal Grandmother    • Bone cancer Maternal Grandmother    • Lung cancer Maternal Grandfather    • Prostate cancer Maternal Uncle       Current Medications:     Current Outpatient Medications   Medication Sig Dispense Refill   • Multiple Vitamins-Minerals (MULTIVITAMIN ADULT PO) Take 1 tablet by mouth daily     • Omega-3 1000 MG CAPS Take 2 capsules by mouth daily       No current facility-administered medications for this visit. Allergies:     No Known Allergies   Physical Exam:     /78   Pulse 74   Resp 16   Ht 6' 2" (1.88 m)   Wt 99.3 kg (219 lb)   SpO2 97%   BMI 28.12 kg/m²     Physical Exam  Constitutional:       General: He is not in acute distress. Appearance: He is well-developed. He is not toxic-appearing. HENT:      Head: Normocephalic. Right Ear: Tympanic membrane normal.      Left Ear: Tympanic membrane normal.      Mouth/Throat:      Mouth: Mucous membranes are moist.      Pharynx: No oropharyngeal exudate. Eyes:      Conjunctiva/sclera: Conjunctivae normal.   Cardiovascular:      Rate and Rhythm: Normal rate and regular rhythm. Heart sounds: No murmur heard. Pulmonary:      Effort: No respiratory distress. Breath sounds: No wheezing or rales. Abdominal:      General: Bowel sounds are normal.      Palpations: Abdomen is soft. There is no mass. Tenderness: There is no abdominal tenderness. Comments: He had no hepatomegaly   Musculoskeletal:      Cervical back: Neck supple. Skin:     General: Skin is warm and dry. Findings: No rash. Neurological:      Mental Status: He is alert and oriented to person, place, and time.       Cranial Nerves: No cranial nerve deficit. Motor: No weakness.    Psychiatric:         Mood and Affect: Mood normal.          Clarisa Delgado MD   [address] [place] [address]

## 2023-07-13 ENCOUNTER — TELEPHONE (OUTPATIENT)
Dept: ADMINISTRATIVE | Facility: OTHER | Age: 38
End: 2023-07-13

## 2023-07-13 NOTE — TELEPHONE ENCOUNTER
Upon review of the In Basket request we were able to locate, review, and update the patient chart as requested for Diabetic Eye Exam.    Any additional questions or concerns should be emailed to the Practice Liaisons via the appropriate education email address, please do not reply via In Basket.     Thank you  Hever Rascon

## 2023-07-13 NOTE — TELEPHONE ENCOUNTER
----- Message from Dwight Skelton sent at 7/12/2023  4:00 PM EDT -----  Regarding: care gap request  DM eye exam  Contact: [phone]  07/12/23 4:00 PM    Hello, our patient attached above has had DM eye exam completed/performed. Please assist in updating the patient chart by finding the document in media--scanned in on 9/20/22. Saw Dr Rhoades Sender in [facility]. The date of service is 9/19/22.      Thank you,  Juan Baker  [facility]

## 2023-08-17 ENCOUNTER — TELEPHONE (OUTPATIENT)
Dept: FAMILY MEDICINE CLINIC | Facility: HOSPITAL | Age: 38
End: 2023-08-17
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
## 2023-08-17 RX ORDER — DOXYCYCLINE HYCLATE 100 MG
100 TABLET ORAL 2 TIMES DAILY
Qty: 28 TABLET | Refills: 0 | Status: SHIPPED | OUTPATIENT
Start: 2023-08-17 | End: 2023-08-31

## 2023-08-17 NOTE — TELEPHONE ENCOUNTER
PT notified that script was sent to [facility]
Spoke to PT - he was down in [place] in the woods - came home and showered and his wife pulled over 79 ticks off of him - ticks were very tiny and only 3 were in bedded. Currently PT is doing OK - no reaction, no bull eyes. Did you want to treat PT with doxycyline?
Yes, my name is Cash Lowepool. The second date of birth is [birth year]. I'm a patient of doctor [facility]. I was calling to get guidance My wife removed a lot of ticks off of me last night and I wanted to see if I need to start a course of antibiotics. If someone can please give me a call back. My phone number is [phone]. Again [phone].
2 = A lot of assistance

## 2023-09-19 LAB
LEFT EYE DIABETIC RETINOPATHY: POSITIVE
RIGHT EYE DIABETIC RETINOPATHY: POSITIVE

## 2023-09-25 ENCOUNTER — TELEPHONE (OUTPATIENT)
Dept: ADMINISTRATIVE | Facility: OTHER | Age: 38
End: 2023-09-25

## 2023-09-25 NOTE — TELEPHONE ENCOUNTER
Upon review of the In Basket request and the patient's chart, initial outreach has been made via fax to facility. Please see Contacts section for details. Thank you  Jonathan Harrison MA     Scanned document 9-25-23  Does not have documentation to close gap.   Called Dr. Jasmine Swenson office for updated form

## 2023-09-25 NOTE — LETTER
Diabetic Eye Exam Form    Date Requested: 23  Patient: Emil Bah  Patient : 1985   Referring Provider: Veronica Torres MD      DIABETIC Eye Exam Date _______________________________      Type of Exam MUST be documented for Diabetic Eye Exams. Please CHECK ONE. Retinal Exam       Dilated Retinal Exam       OCT       Optomap-Iris Exam      Fundus Photography       Left Eye - Please check Retinopathy or No Retinopathy        Exam did show retinopathy    Exam did not show retinopathy       Right Eye - Please check Retinopathy or No Retinopathy       Exam did show retinopathy    Exam did not show retinopathy       Comments __________________________________________________________    Practice Providing Exam ______________________________________________    Exam Performed By (print name) _______________________________________      Provider Signature ___________________________________________________  The form sent to us the other day did not have any documentation of Diabetic Retinopathy or not. These reports are needed for  compliance. Please fax this completed form and a copy of the Diabetic Eye Exam report to our office located at [address] [place] [address] as soon as possible via Fax [phone] attention Margaxu Pittman: Phone [phone]  We thank you for your assistance in treating our mutual patient.

## 2023-09-25 NOTE — TELEPHONE ENCOUNTER
----- Message from Louie Wood sent at 9/25/2023  8:07 AM EDT -----  Regarding: diabetic eye exam  09/25/23 8:08 AM    Hello, our patient Akhil Saez has had Diabetic Eye Exam completed/performed. Please assist in updating the patient chart by pulling the document from the Media Tab. The date of service is 9/19/23.      Thank you,  Louie Wood  [facility]

## 2023-10-05 NOTE — TELEPHONE ENCOUNTER
As a follow-up, a second attempt has been made for outreach via telephone call to facility. Please see Contacts section for details.     Thank you  Fátima Gallego MA

## 2023-10-09 NOTE — TELEPHONE ENCOUNTER
Upon review of the In Basket request we have found/obtained the documentation. After careful review of the document we are unable to complete this request for Diabetic Eye Exam because the documentation does not have the proper verbiage (wording) needed to close the requested care gap(s). Any additional questions or concerns should be emailed to the Practice Liaisons via the appropriate education email address, please do not reply via In Basket.     Thank you  Paul Shaikh MA             Thank you  Paul Shaikh MA

## 2023-11-14 ENCOUNTER — RA CDI HCC (OUTPATIENT)
Dept: OTHER | Facility: HOSPITAL | Age: 38
End: 2023-11-14

## 2023-11-14 NOTE — PROGRESS NOTES
[address] [facility] coding opportunities          Chart Reviewed number of suggestions sent to Provider: 1     Patients Insurance        Commercial Insurance: [employer]     [id number] independent

## 2023-12-15 LAB
ALBUMIN SERPL-MCNC: 4.8 G/DL (ref 3.6–5.1)
ALBUMIN/GLOB SERPL: 2.1 (CALC) (ref 1–2.5)
ALP SERPL-CCNC: 60 U/L (ref 36–130)
ALT SERPL-CCNC: 52 U/L (ref 9–46)
AST SERPL-CCNC: 24 U/L (ref 10–40)
BILIRUB SERPL-MCNC: 1.1 MG/DL (ref 0.2–1.2)
BUN SERPL-MCNC: 23 MG/DL (ref 7–25)
BUN/CREAT SERPL: ABNORMAL (CALC) (ref 6–22)
CALCIUM SERPL-MCNC: 9.2 MG/DL (ref 8.6–10.3)
CHLORIDE SERPL-SCNC: 101 MMOL/L (ref 98–110)
CHOLEST SERPL-MCNC: 198 MG/DL
CHOLEST/HDLC SERPL: 4.4 (CALC)
CO2 SERPL-SCNC: 27 MMOL/L (ref 20–32)
CREAT SERPL-MCNC: 1.03 MG/DL (ref 0.6–1.26)
EST. AVERAGE GLUCOSE BLD GHB EST-MCNC: 163 MG/DL
EST. AVERAGE GLUCOSE BLD GHB EST-SCNC: 9 MMOL/L
GFR/BSA.PRED SERPLBLD CYS-BASED-ARV: 95 ML/MIN/1.73M2
GLOBULIN SER CALC-MCNC: 2.3 G/DL (CALC) (ref 1.9–3.7)
GLUCOSE SERPL-MCNC: 132 MG/DL (ref 65–99)
HBA1C MFR BLD: 7.3 % OF TOTAL HGB
HDLC SERPL-MCNC: 45 MG/DL
LDLC SERPL CALC-MCNC: 135 MG/DL (CALC)
NONHDLC SERPL-MCNC: 153 MG/DL (CALC)
POTASSIUM SERPL-SCNC: 4.1 MMOL/L (ref 3.5–5.3)
PROT SERPL-MCNC: 7.1 G/DL (ref 6.1–8.1)
SODIUM SERPL-SCNC: 137 MMOL/L (ref 135–146)
TRIGL SERPL-MCNC: 83 MG/DL

## 2023-12-18 ENCOUNTER — OFFICE VISIT (OUTPATIENT)
Dept: FAMILY MEDICINE CLINIC | Facility: HOSPITAL | Age: 38
End: 2023-12-18
Payer: COMMERCIAL

## 2023-12-18 VITALS
SYSTOLIC BLOOD PRESSURE: 158 MMHG | DIASTOLIC BLOOD PRESSURE: 88 MMHG | OXYGEN SATURATION: 97 % | WEIGHT: 227 LBS | HEIGHT: 74 IN | BODY MASS INDEX: 29.13 KG/M2 | HEART RATE: 86 BPM | RESPIRATION RATE: 16 BRPM

## 2023-12-18 DIAGNOSIS — Z30.09 ENCOUNTER FOR VASECTOMY COUNSELING: ICD-10-CM

## 2023-12-18 DIAGNOSIS — E11.3293 TYPE 2 DIABETES MELLITUS WITH BOTH EYES AFFECTED BY MILD NONPROLIFERATIVE RETINOPATHY WITHOUT MACULAR EDEMA, WITHOUT LONG-TERM CURRENT USE OF INSULIN (HCC): ICD-10-CM

## 2023-12-18 DIAGNOSIS — D86.0 SARCOIDOSIS OF LUNG (HCC): ICD-10-CM

## 2023-12-18 DIAGNOSIS — E11.9 TYPE 2 DIABETES MELLITUS WITHOUT COMPLICATION, WITHOUT LONG-TERM CURRENT USE OF INSULIN (HCC): Primary | ICD-10-CM

## 2023-12-18 PROCEDURE — 99213 OFFICE O/P EST LOW 20 MIN: CPT | Performed by: INTERNAL MEDICINE

## 2023-12-18 NOTE — ASSESSMENT & PLAN NOTE
Lab Results   Component Value Date    HGBA1C 6.6 (H) 06/30/2023     Patient has mild nonproliferative retinopathy in both eyes.  His A1c increased to 7.3.  We discussed diabetic diet and physical activity to lower blood sugars with change in lifestyle and diet as per patient's request.  I requested consult from the diabetic educator to help patient with his diet.    Recheck A1c in 4 months and will start medication if blood sugars have not improved.

## 2023-12-18 NOTE — PROGRESS NOTES
Assessment/Plan:    Type 2 diabetes mellitus with both eyes affected by mild nonproliferative retinopathy without macular edema, without long-term current use of insulin ([facility])    Lab Results   Component Value Date    HGBA1C 6.6 (H) 06/30/2023     Patient has mild nonproliferative retinopathy in both eyes.  His A1c increased to 7.3.  We discussed diabetic diet and physical activity to lower blood sugars with change in lifestyle and diet as per patient's request.  I requested consult from the diabetic educator to help patient with his diet.    Recheck A1c in 4 months and will start medication if blood sugars have not improved.    Sarcoidosis of lung (HCC)  Patient remains asymptomatic.  Lungs are completely clear to auscultation.  Will monitor     Diagnoses and all orders for this visit:    Type 2 diabetes mellitus without complication, without long-term current use of insulin ([facility])  -     Microalbumin, Random Urine (W/Creatinine) ([facility] ONLY); Future  -     Hemoglobin A1c (w/out EAG) ([facility] ONLY); Future  -     Comprehensive metabolic panel; Future  -     TSH, 3rd generation with Free T4 reflex; Future  -     Lipid Panel with Direct LDL reflex; Future  -     Microalbumin, Random Urine (W/Creatinine) ([facility] ONLY)  -     Hemoglobin A1c (w/out EAG) ([facility] ONLY)  -     Comprehensive metabolic panel  -     TSH, 3rd generation with Free T4 reflex  -     Lipid Panel with Direct LDL reflex  -     Ambulatory referral to Diabetic Education - use to refer for diabetes group classes, individual diabetes education, medical nutrition therapy, device training; Future    Encounter for vasectomy counseling  -     Ambulatory Referral to Urology; Future    Type 2 diabetes mellitus with both eyes affected by mild nonproliferative retinopathy without macular edema, without long-term current use of insulin ([facility])    Sarcoidosis of lung ([facility])          Subjective:      Patient ID: Eddie Wall is a 38 y.o. male.      [facility]    Patient  "presents for follow-up of chronic conditions as detailed in the assessment and plan.      The following portions of the patient's history were reviewed and updated as appropriate: current medications, past family history, past medical history, past social history, past surgical history and problem list.    Review of Systems   Eyes:  Negative for visual disturbance.   Respiratory:  Negative for cough and shortness of breath.    Cardiovascular:  Negative for chest pain.   Musculoskeletal:  Negative for arthralgias.   Skin:  Negative for rash.         Objective:    /88   Pulse 86   Resp 16   Ht 6' 2\" (1.88 m)   Wt 103 kg (227 lb)   SpO2 97%   BMI 29.15 kg/m²      Physical Exam  Constitutional:       General: He is not in acute distress.     Appearance: He is not toxic-appearing.   Cardiovascular:      Rate and Rhythm: Normal rate and regular rhythm.      Heart sounds: No murmur heard.  Pulmonary:      Effort: No respiratory distress.      Breath sounds: No wheezing or rales.   Neurological:      Mental Status: He is alert.             Mckayla Miller MD  "

## 2023-12-19 ENCOUNTER — TELEPHONE (OUTPATIENT)
Dept: ADMINISTRATIVE | Facility: OTHER | Age: 38
End: 2023-12-19

## 2023-12-19 NOTE — TELEPHONE ENCOUNTER
----- Message from Stephanie Baker sent at 12/18/2023  2:12 PM EST -----  Regarding: care gap request  DM Eye Exam  12/18/23 2:12 PM    Hello, our patient attached above has had DM EYE EXAM completed/performed. Please assist in updating the patient chart by CONTACTING DR AMBROSIO, [address] [place] [address], [facility]. [id number].  PHONE [phone].    The date of service is 9/19/23.       Thank you,  Stephanie Baker  [facility]

## 2023-12-19 NOTE — TELEPHONE ENCOUNTER
Upon review of the In Basket request and the patient's chart, initial outreach has been made via fax to facility. Please see Contacts section for details.     Thank you  Alfonso Valiente

## 2023-12-19 NOTE — LETTER
Diabetic Eye Exam Form    Date Requested: 23  Patient: Eddie Wall  Patient : 1985   Referring Provider: Mckayla Miller MD      DIABETIC Eye Exam Date _______23________________      Type of Exam MUST be documented for Diabetic Eye Exams. Please CHECK ONE.     Retinal Exam       Dilated Retinal Exam       OCT       Optomap-Iris Exam      Fundus Photography       Left Eye - Please check Retinopathy or No Retinopathy        Exam did show retinopathy    Exam did not show retinopathy       Right Eye - Please check Retinopathy or No Retinopathy       Exam did show retinopathy    Exam did not show retinopathy       Comments _previous form sent did not include DM eye exam results_______________________________________    Practice Providing Exam ______________________________________________    Exam Performed By (print name) _______________________________________      Provider Signature ___________________________________________________      These reports are needed for  compliance.  Please fax this completed form and a copy of the Diabetic Eye Exam report to our office located at [address] [place] [address] as soon as possible via Fax [phone] attention Alfonso: Phone [phone]  We thank you for your assistance in treating our mutual patient.

## 2024-01-10 ENCOUNTER — OFFICE VISIT (OUTPATIENT)
Dept: DIABETES SERVICES | Facility: HOSPITAL | Age: 39
End: 2024-01-10
Payer: COMMERCIAL

## 2024-01-10 VITALS — WEIGHT: 225 LBS | BODY MASS INDEX: 28.88 KG/M2 | HEIGHT: 74 IN

## 2024-01-10 DIAGNOSIS — E11.9 TYPE 2 DIABETES MELLITUS WITHOUT COMPLICATION, WITHOUT LONG-TERM CURRENT USE OF INSULIN (HCC): Primary | ICD-10-CM

## 2024-01-10 PROCEDURE — 97802 MEDICAL NUTRITION INDIV IN: CPT | Performed by: DIETITIAN, REGISTERED

## 2024-01-10 NOTE — PROGRESS NOTES
"Medical Nutrition Therapy     Assessment    Visit Type: Initial visit  Chief complaint:  Type 2 diabetes     HPI:  Met with Allen for initial MNT.  Started testing blood sugars at home. Fastin-150 fasting avg, 2hr after dinner 130ish.  He has been doing a  fasting plan, which is causing elevated fasting sugars. I asked him for the next 2 weeks to have a bedtime snack, 15ish grams of carbs with protein, and see if that improves his fasting sugars, as it often does. He will then do some pair testing, before a meal and 2hr after a meal, rotating between meals, to assess how food affects his blood sugars, just a few days a week.  Food recall shows primary issues: discussed that a little carb is often better than no carb. Previously he was eating high carb, typical edmond diet, and then transitioned to no carbs. Discussed the benefit often of having some carbs vs none. He will experiment with the things discussed and see what gives him the best blood sugars.  Together we discussed what foods contain CHO, reading a food label, serving sizes, and set a carb goal of 30-45g CHO/meal to promote weight loss with 15g snacks. Put together sample meals for Eddie's reference and evaluated Eddie's current eating plan. Good understanding, Eddie will call with questions or for more education. Follow up as needed if not improving.      Ht Readings from Last 1 Encounters:   01/10/24 6' 1.5\" (1.867 m)     Wt Readings from Last 3 Encounters:   01/10/24 102 kg (225 lb)   23 103 kg (227 lb)   23 99.3 kg (219 lb)        Body mass index is 29.28 kg/m².     Lab Results   Component Value Date    HGBA1C 7.3 (H) 12/15/2023    HGBA1C 6.6 (H) 2023    HGBA1C 7.2 (H) 2023       Lab Results   Component Value Date    CHOL 152 10/15/2015     Lab Results   Component Value Date    HDL 45 12/15/2023    HDL 56 2023    HDL 43 2023     Lab Results   Component Value Date    LDLCALC 135 (H) 12/15/2023    LDLCALC " "125 (H) 06/30/2023    LDLCALC 119 (H) 03/28/2023     Lab Results   Component Value Date    TRIG 83 12/15/2023    TRIG 72 06/30/2023    TRIG 170 (H) 03/28/2023     No results found for: \"CHOLHDL\"    Weight Change: No    Medical Diagnosis/ICD 10 Code:  E11.9    Barriers to Learning: no barriers    Do you follow any special diet presently?: No  Who shops: patient and spouse  Who cooks: patient and spouse    Food Log: Completed via the method of food recall-     Breakfast: up around 5:30am: breakfast 16/8 fasting til 11am- black coffee and water. Was: 4 eggs skipped the toast.   Morning Snack:baby carrots, apple, banana, pistachio mix- picks at this from 11am to 1pm. Used to be: grabbing pizza if not prepped meals. Was leftover chicken or meat apple bar.   Lunch:  Afternoon Snack: before getting the kids- cheese  Dinner:6pm: since the new year meat and vegetable greens mostly, used to do a carb with it. Used to be what the kids were having- pizza, chicken nuggets.   Evening Snack:noting after 7pm. Used to snack at night on cheesezits yogurt greek pretzels ice cream. Bedtime 10:15pm   Beverages: water, coffee black rare milk, juice oj a few a month, rare flavored water lemon, hot decaf green tea, gatorade in the summer zero, rare milk.   Eating out/Take out:  Exercise active at his job, 3-4 times a week 30min on the treadmill in the morning.     Nutrition Diagnosis:  Food and nutrition related knowledge deficit  related to Lack of prior exposure to accurate nutrition related information as evidenced by Verbalizes inaccurate or incomplete information    Intervention: plate method, label reading, behavior modification strategies, carbohydrate counting, meal planning, individualized meal plan, and monitoring portion control     Treatment Goals: Patient understands education and recommendations    Education Material Given  Eddie was provided the Portion Booklet and Planning Healthy Meals     Monitoring and " evaluation:    Term code indicator  FH 1.6.3 Carbohydrate Intake Criteria: 15-30g CHO per meal, 15g CHO snacks    Patient’s Response to Instruction:  Comprehensiongood  Motivationgood  Expected Compliancegood    Thank you for coming to the [facility] for education today.  Please feel free to call with any questions or concerns.    Anila Rea, RD  [address] [address]

## 2024-02-06 NOTE — PROGRESS NOTES
[facility] EMERGENCY DEPARTMENT  [address]  [facility] [address]  Phone: [phone]    Mayur Garcia was seen and treated in our emergency department on 2/6/2024.  He may return to work on 02/06/2024.  Patient seen in ED and is cleared to return to work and sober living.       Thank you for choosing [facility].    Carol Ford, PA       [facility]  coding opportunities          Chart Reviewed number of suggestions sent to Provider: 1  E11 22     Patients Insurance        Commercial Insurance: [employer]

## 2024-02-07 NOTE — TELEPHONE ENCOUNTER
As a follow-up, a second attempt has been made for outreach via fax to facility. Please see Contacts section for details.    Thank you  Alfonso Valiente

## 2024-02-15 NOTE — TELEPHONE ENCOUNTER
Upon review of the In Basket request we were able to locate, review, and update the patient chart as requested for Diabetic Eye Exam.    Any additional questions or concerns should be emailed to the Practice Liaisons via the appropriate education email address, please do not reply via In Basket.    Thank you  Alfonso Valiente

## 2024-04-12 ENCOUNTER — RA CDI HCC (OUTPATIENT)
Dept: OTHER | Facility: HOSPITAL | Age: 39
End: 2024-04-12

## 2024-04-12 NOTE — PROGRESS NOTES
HCC coding opportunities          Chart Reviewed number of suggestions sent to Provider: 1     Patients Insurance        Commercial Insurance: [employer]

## 2024-04-17 LAB
ALBUMIN SERPL-MCNC: 4.8 G/DL (ref 3.6–5.1)
ALBUMIN/CREAT UR: 38 MG/G CREAT
ALBUMIN/GLOB SERPL: 2.1 (CALC) (ref 1–2.5)
ALP SERPL-CCNC: 64 U/L (ref 36–130)
ALT SERPL-CCNC: 28 U/L (ref 9–46)
AST SERPL-CCNC: 17 U/L (ref 10–40)
BILIRUB SERPL-MCNC: 0.9 MG/DL (ref 0.2–1.2)
BUN SERPL-MCNC: 21 MG/DL (ref 7–25)
BUN/CREAT SERPL: ABNORMAL (CALC) (ref 6–22)
CALCIUM SERPL-MCNC: 9.6 MG/DL (ref 8.6–10.3)
CHLORIDE SERPL-SCNC: 102 MMOL/L (ref 98–110)
CHOLEST SERPL-MCNC: 193 MG/DL
CHOLEST/HDLC SERPL: 4.3 (CALC)
CO2 SERPL-SCNC: 28 MMOL/L (ref 20–32)
CREAT SERPL-MCNC: 0.97 MG/DL (ref 0.6–1.29)
CREAT UR-MCNC: 177 MG/DL (ref 20–320)
GFR/BSA.PRED SERPLBLD CYS-BASED-ARV: 101 ML/MIN/1.73M2
GLOBULIN SER CALC-MCNC: 2.3 G/DL (CALC) (ref 1.9–3.7)
GLUCOSE SERPL-MCNC: 132 MG/DL (ref 65–99)
HBA1C MFR BLD: 7.1 % OF TOTAL HGB
HDLC SERPL-MCNC: 45 MG/DL
LDLC SERPL CALC-MCNC: 114 MG/DL (CALC)
MICROALBUMIN UR-MCNC: 6.8 MG/DL
NONHDLC SERPL-MCNC: 148 MG/DL (CALC)
POTASSIUM SERPL-SCNC: 4.2 MMOL/L (ref 3.5–5.3)
PROT SERPL-MCNC: 7.1 G/DL (ref 6.1–8.1)
SODIUM SERPL-SCNC: 137 MMOL/L (ref 135–146)
TRIGL SERPL-MCNC: 216 MG/DL
TSH SERPL-ACNC: 0.97 MIU/L (ref 0.4–4.5)

## 2024-04-19 ENCOUNTER — OFFICE VISIT (OUTPATIENT)
Dept: FAMILY MEDICINE CLINIC | Facility: HOSPITAL | Age: 39
End: 2024-04-19
Payer: COMMERCIAL

## 2024-04-19 VITALS
OXYGEN SATURATION: 98 % | DIASTOLIC BLOOD PRESSURE: 76 MMHG | RESPIRATION RATE: 16 BRPM | TEMPERATURE: 97.5 F | HEART RATE: 63 BPM | WEIGHT: 219 LBS | BODY MASS INDEX: 28.11 KG/M2 | SYSTOLIC BLOOD PRESSURE: 128 MMHG | HEIGHT: 74 IN

## 2024-04-19 DIAGNOSIS — E53.8 B12 DEFICIENCY: ICD-10-CM

## 2024-04-19 DIAGNOSIS — E11.3293 TYPE 2 DIABETES MELLITUS WITH BOTH EYES AFFECTED BY MILD NONPROLIFERATIVE RETINOPATHY WITHOUT MACULAR EDEMA, WITHOUT LONG-TERM CURRENT USE OF INSULIN (HCC): Primary | ICD-10-CM

## 2024-04-19 DIAGNOSIS — R80.9 TYPE 2 DIABETES MELLITUS WITH DIABETIC MICROALBUMINURIA, WITHOUT LONG-TERM CURRENT USE OF INSULIN (HCC): ICD-10-CM

## 2024-04-19 DIAGNOSIS — D86.0 SARCOIDOSIS OF LUNG (HCC): ICD-10-CM

## 2024-04-19 DIAGNOSIS — E11.29 TYPE 2 DIABETES MELLITUS WITH DIABETIC MICROALBUMINURIA, WITHOUT LONG-TERM CURRENT USE OF INSULIN (HCC): ICD-10-CM

## 2024-04-19 PROBLEM — Z30.09 ENCOUNTER FOR VASECTOMY COUNSELING: Status: RESOLVED | Noted: 2023-12-18 | Resolved: 2024-04-19

## 2024-04-19 PROBLEM — M75.41 IMPINGEMENT SYNDROME OF RIGHT SHOULDER REGION: Status: RESOLVED | Noted: 2022-03-01 | Resolved: 2024-04-19

## 2024-04-19 PROBLEM — R06.83 SNORING: Status: RESOLVED | Noted: 2021-06-25 | Resolved: 2024-04-19

## 2024-04-19 PROCEDURE — 99214 OFFICE O/P EST MOD 30 MIN: CPT | Performed by: INTERNAL MEDICINE

## 2024-04-19 RX ORDER — METFORMIN HYDROCHLORIDE 500 MG/1
500 TABLET, EXTENDED RELEASE ORAL 2 TIMES DAILY WITH MEALS
Qty: 180 TABLET | Refills: 1 | Status: SHIPPED | OUTPATIENT
Start: 2024-04-19

## 2024-04-19 NOTE — PROGRESS NOTES
Assessment/Plan:    Type 2 diabetes mellitus with both eyes affected by mild nonproliferative retinopathy without macular edema, without long-term current use of insulin ([facility])    Lab Results   Component Value Date    HGBA1C 7.1 (H) 04/16/2024       A1c decreased from 7.3 to 7.1  Pt follows diabetic diet and exercises more  He had retinopathy in 9/23 and microalbuminuria this month.  -170 at home  Monofilament testing of the feet is normal today  We decided to start metformin 500mg bid, continue diet and exercise. Recheck A1c in 3 months before next appointment.        Type 2 diabetes mellitus with diabetic microalbuminuria, without long-term current use of insulin ([facility])    Lab Results   Component Value Date    HGBA1C 7.1 (H) 04/16/2024     Pt had mild microalbuminuria this month.  EGFR was normal  BP is stable  Will lower glucose  Recheck urine microalbumin in 3 months    Sarcoidosis of lung ([facility])  Pt has hx of sarcoidosis with mediastinal lymphadenopathy  He's asymptomatic. Denies fevers, cough, dyspnea, rash  Cxr in 2022 showed no lymphadenopathy  Will monitor pt     Diagnoses and all orders for this visit:    Type 2 diabetes mellitus with both eyes affected by mild nonproliferative retinopathy without macular edema, without long-term current use of insulin ([facility])  -     Hemoglobin A1c (w/out EAG) ([facility] ONLY); Future  -     Microalbumin, Random Urine (W/Creatinine) ([facility] ONLY); Future  -     Basic metabolic panel; Future  -     metFORMIN (GLUCOPHAGE-XR) 500 mg 24 hr tablet; Take 1 tablet (500 mg total) by mouth 2 (two) times a day with meals  -     Hemoglobin A1c (w/out EAG) ([facility] ONLY)  -     Microalbumin, Random Urine (W/Creatinine) ([facility] ONLY)  -     Basic metabolic panel    Type 2 diabetes mellitus with diabetic microalbuminuria, without long-term current use of insulin ([facility])    B12 deficiency  -     Vitamin B12; Future  -     Vitamin B12    Sarcoidosis of lung ([facility])          Subjective:      Patient  "ID: Eddie Wall is a 39 y.o. male.      HPI    Patient presents for follow-up of chronic conditions as detailed in the assessment and plan.      The following portions of the patient's history were reviewed and updated as appropriate: current medications, past family history, past medical history, past social history, past surgical history and problem list.    Review of Systems      Objective:    /76   Pulse 63   Temp 97.5 °F (36.4 °C) (Tympanic)   Resp 16   Ht 6' 1.5\" (1.867 m)   Wt 99.3 kg (219 lb)   SpO2 98%   BMI 28.50 kg/m²      Physical Exam  Constitutional:       General: He is not in acute distress.     Appearance: He is not toxic-appearing.   Cardiovascular:      Rate and Rhythm: Normal rate and regular rhythm.      Pulses: no weak pulses.           Dorsalis pedis pulses are 2+ on the right side and 2+ on the left side.        Posterior tibial pulses are 2+ on the right side and 2+ on the left side.      Heart sounds: No murmur heard.  Pulmonary:      Effort: No respiratory distress.      Breath sounds: No wheezing or rales.   Feet:      Right foot:      Skin integrity: No ulcer.      Left foot:      Skin integrity: No ulcer.   Neurological:      Mental Status: He is alert.         Diabetic Foot Exam    Patient's shoes and socks removed.    Right Foot/Ankle   Right Foot Inspection  Skin Exam: No ulcer.     Sensory   Monofilament testing: intact    Vascular  The right DP pulse is 2+. The right PT pulse is 2+.     Left Foot/Ankle  Left Foot Inspection  Skin Exam: No ulcer.     Sensory   Monofilament testing: intact    Vascular  The left DP pulse is 2+. The left PT pulse is 2+.     Assign Risk Category  No deformity present  No loss of protective sensation  No weak pulses  Risk: 0      Mckayla Miller MD  "

## 2024-04-19 NOTE — ASSESSMENT & PLAN NOTE
Pt has hx of sarcoidosis with mediastinal lymphadenopathy  He's asymptomatic. Denies fevers, cough, dyspnea, rash  Cxr in 2022 showed no lymphadenopathy  Will monitor pt

## 2024-04-19 NOTE — ASSESSMENT & PLAN NOTE
Lab Results   Component Value Date    HGBA1C 7.1 (H) 04/16/2024     Pt had mild microalbuminuria this month.  EGFR was normal  BP is stable  Will lower glucose  Recheck urine microalbumin in 3 months

## 2024-04-19 NOTE — ASSESSMENT & PLAN NOTE
Lab Results   Component Value Date    HGBA1C 7.1 (H) 04/16/2024       A1c decreased from 7.3 to 7.1  Pt follows diabetic diet and exercises more  He had retinopathy in 9/23 and microalbuminuria this month.  -170 at home  Monofilament testing of the feet is normal today  We decided to start metformin 500mg bid, continue diet and exercise. Recheck A1c in 3 months before next appointment.

## 2024-07-12 ENCOUNTER — RA CDI HCC (OUTPATIENT)
Dept: OTHER | Facility: HOSPITAL | Age: 39
End: 2024-07-12

## 2024-07-12 NOTE — PROGRESS NOTES
HCC coding opportunities          Chart Reviewed number of suggestions sent to Provider: 1  E11.65     Patients Insurance        Commercial Insurance: [employer]

## 2024-08-28 LAB
ALBUMIN/CREAT UR: 41 MG/G CREAT
BUN SERPL-MCNC: 23 MG/DL (ref 7–25)
BUN/CREAT SERPL: ABNORMAL (CALC) (ref 6–22)
CALCIUM SERPL-MCNC: 9.7 MG/DL (ref 8.6–10.3)
CHLORIDE SERPL-SCNC: 102 MMOL/L (ref 98–110)
CO2 SERPL-SCNC: 28 MMOL/L (ref 20–32)
CREAT SERPL-MCNC: 0.91 MG/DL (ref 0.6–1.29)
CREAT UR-MCNC: 126 MG/DL (ref 20–320)
GFR/BSA.PRED SERPLBLD CYS-BASED-ARV: 109 ML/MIN/1.73M2
GLUCOSE SERPL-MCNC: 120 MG/DL (ref 65–99)
HBA1C MFR BLD: 6.7 % OF TOTAL HGB
MICROALBUMIN UR-MCNC: 5.2 MG/DL
POTASSIUM SERPL-SCNC: 4.2 MMOL/L (ref 3.5–5.3)
SODIUM SERPL-SCNC: 139 MMOL/L (ref 135–146)
VIT B12 SERPL-MCNC: 588 PG/ML (ref 200–1100)

## 2024-08-29 ENCOUNTER — OFFICE VISIT (OUTPATIENT)
Dept: FAMILY MEDICINE CLINIC | Facility: HOSPITAL | Age: 39
End: 2024-08-29
Payer: COMMERCIAL

## 2024-08-29 VITALS
RESPIRATION RATE: 16 BRPM | HEART RATE: 68 BPM | SYSTOLIC BLOOD PRESSURE: 130 MMHG | TEMPERATURE: 98.1 F | HEIGHT: 74 IN | BODY MASS INDEX: 28.23 KG/M2 | WEIGHT: 220 LBS | OXYGEN SATURATION: 96 % | DIASTOLIC BLOOD PRESSURE: 78 MMHG

## 2024-08-29 DIAGNOSIS — E11.29 TYPE 2 DIABETES MELLITUS WITH DIABETIC MICROALBUMINURIA, WITHOUT LONG-TERM CURRENT USE OF INSULIN (HCC): Primary | ICD-10-CM

## 2024-08-29 DIAGNOSIS — D86.0 SARCOIDOSIS OF LUNG (HCC): ICD-10-CM

## 2024-08-29 DIAGNOSIS — R80.9 TYPE 2 DIABETES MELLITUS WITH DIABETIC MICROALBUMINURIA, WITHOUT LONG-TERM CURRENT USE OF INSULIN (HCC): Primary | ICD-10-CM

## 2024-08-29 DIAGNOSIS — E11.3293 TYPE 2 DIABETES MELLITUS WITH BOTH EYES AFFECTED BY MILD NONPROLIFERATIVE RETINOPATHY WITHOUT MACULAR EDEMA, WITHOUT LONG-TERM CURRENT USE OF INSULIN (HCC): ICD-10-CM

## 2024-08-29 PROCEDURE — 99213 OFFICE O/P EST LOW 20 MIN: CPT | Performed by: INTERNAL MEDICINE

## 2024-08-29 RX ORDER — METFORMIN HCL 500 MG
500 TABLET, EXTENDED RELEASE 24 HR ORAL 2 TIMES DAILY WITH MEALS
Qty: 180 TABLET | Refills: 3 | Status: SHIPPED | OUTPATIENT
Start: 2024-08-29

## 2024-08-29 NOTE — ASSESSMENT & PLAN NOTE
Lab Results   Component Value Date    HGBA1C 6.7 (H) 08/27/2024     Patient has type 2 diabetes mellitus with retinopathy and microalbuminuria.  Restarted metformin.  Patient denies diarrhea or other GI side effects of metformin.  His A1c decreased from 7.1 to 6.7.  We reviewed labs from August 27.    Patient will continue diet and taking metformin twice a day.  I will recheck his A1c and urine microalbumin in 6 months when I see him

## 2024-08-29 NOTE — PROGRESS NOTES
Ambulatory Visit  Name: Eddie Wall      : 1985      MRN: [id number]  Encounter Provider: Mckayla Miller MD  Encounter Date: 2024   Encounter department: [facility] CARE [address]    Assessment & Plan   1. Type 2 diabetes mellitus with diabetic microalbuminuria, without long-term current use of insulin ([facility])  Assessment & Plan:    Lab Results   Component Value Date    HGBA1C 6.7 (H) 2024     Patient has type 2 diabetes mellitus with retinopathy and microalbuminuria.  Restarted metformin.  Patient denies diarrhea or other GI side effects of metformin.  His A1c decreased from 7.1 to 6.7.  We reviewed labs from .    Patient will continue diet and taking metformin twice a day.  I will recheck his A1c and urine microalbumin in 6 months when I see him    Orders:  -     Comprehensive metabolic panel; Future; Expected date: 2025  -     Lipid Panel with Direct LDL reflex; Future; Expected date: 2025  -     TSH, 3rd generation with Free T4 reflex; Future; Expected date: 2025  -     Hemoglobin A1c (w/out EAG) ([facility] ONLY); Future; Expected date: 2025  -     Microalbumin, Random Urine (W/Creatinine) ([facility] ONLY); Future; Expected date: 2025  -     Comprehensive metabolic panel  -     Lipid Panel with Direct LDL reflex  -     TSH, 3rd generation with Free T4 reflex  -     Hemoglobin A1c (w/out EAG) ([facility] ONLY)  -     Microalbumin, Random Urine (W/Creatinine) ([facility] ONLY)  2. Type 2 diabetes mellitus with both eyes affected by mild nonproliferative retinopathy without macular edema, without long-term current use of insulin (HCC)  Assessment & Plan:    Lab Results   Component Value Date    HGBA1C 6.7 (H) 2024     Patient has diabetic retinopathy.  He will see his ophthalmologist this fall.  Orders:  -     metFORMIN (GLUCOPHAGE-XR) 500 mg 24 hr tablet; Take 1 tablet (500 mg total) by mouth 2 (two) times a day with meals  3. Sarcoidosis of  "lung ([facility])  Assessment & Plan:  Patient has history of pulmonary sarcoidosis.  He denies cough, shortness of breath, wheezing.  His lungs are completely clear to auscultation.  Will monitor patient       History of Present Illness     HPI    Review of Systems    Objective     /78   Pulse 68   Temp 98.1 °F (36.7 °C) (Tympanic)   Resp 16   Ht 6' 1.5\" (1.867 m)   Wt 99.8 kg (220 lb)   SpO2 96%   BMI 28.63 kg/m²     Physical Exam  Constitutional:       General: He is not in acute distress.     Appearance: He is not toxic-appearing.   Cardiovascular:      Rate and Rhythm: Normal rate and regular rhythm.      Heart sounds: No murmur heard.  Pulmonary:      Effort: No respiratory distress.      Breath sounds: No wheezing or rales.   Neurological:      Mental Status: He is alert.       Administrative Statements           "

## 2024-08-29 NOTE — ASSESSMENT & PLAN NOTE
Patient has history of pulmonary sarcoidosis.  He denies cough, shortness of breath, wheezing.  His lungs are completely clear to auscultation.  Will monitor patient

## 2024-08-29 NOTE — ASSESSMENT & PLAN NOTE
Lab Results   Component Value Date    HGBA1C 6.7 (H) 08/27/2024     Patient has diabetic retinopathy.  He will see his ophthalmologist this fall.

## 2024-10-22 LAB
LEFT EYE DIABETIC RETINOPATHY: NORMAL
RIGHT EYE DIABETIC RETINOPATHY: NORMAL

## 2025-03-01 LAB
ALBUMIN SERPL-MCNC: 5 G/DL (ref 3.6–5.1)
ALBUMIN/CREAT UR: 116 MG/G CREAT
ALBUMIN/GLOB SERPL: 2.1 (CALC) (ref 1–2.5)
ALP SERPL-CCNC: 64 U/L (ref 36–130)
ALT SERPL-CCNC: 46 U/L (ref 9–46)
AST SERPL-CCNC: 21 U/L (ref 10–40)
BILIRUB SERPL-MCNC: 0.9 MG/DL (ref 0.2–1.2)
BUN SERPL-MCNC: 18 MG/DL (ref 7–25)
BUN/CREAT SERPL: ABNORMAL (CALC) (ref 6–22)
CALCIUM SERPL-MCNC: 10 MG/DL (ref 8.6–10.3)
CHLORIDE SERPL-SCNC: 101 MMOL/L (ref 98–110)
CHOLEST SERPL-MCNC: 230 MG/DL
CHOLEST/HDLC SERPL: 5 (CALC)
CO2 SERPL-SCNC: 29 MMOL/L (ref 20–32)
CREAT SERPL-MCNC: 1.01 MG/DL (ref 0.6–1.29)
CREAT UR-MCNC: 134 MG/DL (ref 20–320)
GFR/BSA.PRED SERPLBLD CYS-BASED-ARV: 96 ML/MIN/1.73M2
GLOBULIN SER CALC-MCNC: 2.4 G/DL (CALC) (ref 1.9–3.7)
GLUCOSE SERPL-MCNC: 151 MG/DL (ref 65–99)
HBA1C MFR BLD: 7.7 % OF TOTAL HGB
HDLC SERPL-MCNC: 46 MG/DL
LDLC SERPL CALC-MCNC: 144 MG/DL (CALC)
MICROALBUMIN UR-MCNC: 15.6 MG/DL
NONHDLC SERPL-MCNC: 184 MG/DL (CALC)
POTASSIUM SERPL-SCNC: 4.4 MMOL/L (ref 3.5–5.3)
PROT SERPL-MCNC: 7.4 G/DL (ref 6.1–8.1)
SODIUM SERPL-SCNC: 138 MMOL/L (ref 135–146)
TRIGL SERPL-MCNC: 246 MG/DL
TSH SERPL-ACNC: 1.05 MIU/L (ref 0.4–4.5)

## 2025-03-03 ENCOUNTER — OFFICE VISIT (OUTPATIENT)
Dept: FAMILY MEDICINE CLINIC | Facility: HOSPITAL | Age: 40
End: 2025-03-03
Payer: COMMERCIAL

## 2025-03-03 VITALS
HEART RATE: 70 BPM | BODY MASS INDEX: 30.03 KG/M2 | DIASTOLIC BLOOD PRESSURE: 86 MMHG | HEIGHT: 74 IN | TEMPERATURE: 97 F | SYSTOLIC BLOOD PRESSURE: 140 MMHG | RESPIRATION RATE: 16 BRPM | WEIGHT: 234 LBS | OXYGEN SATURATION: 97 %

## 2025-03-03 DIAGNOSIS — E11.29 TYPE 2 DIABETES MELLITUS WITH DIABETIC MICROALBUMINURIA, WITHOUT LONG-TERM CURRENT USE OF INSULIN (HCC): Primary | ICD-10-CM

## 2025-03-03 DIAGNOSIS — E78.00 PURE HYPERCHOLESTEROLEMIA: ICD-10-CM

## 2025-03-03 DIAGNOSIS — D86.0 SARCOIDOSIS OF LUNG (HCC): ICD-10-CM

## 2025-03-03 DIAGNOSIS — E11.3293 TYPE 2 DIABETES MELLITUS WITH BOTH EYES AFFECTED BY MILD NONPROLIFERATIVE RETINOPATHY WITHOUT MACULAR EDEMA, WITHOUT LONG-TERM CURRENT USE OF INSULIN (HCC): ICD-10-CM

## 2025-03-03 DIAGNOSIS — R80.9 TYPE 2 DIABETES MELLITUS WITH DIABETIC MICROALBUMINURIA, WITHOUT LONG-TERM CURRENT USE OF INSULIN (HCC): Primary | ICD-10-CM

## 2025-03-03 DIAGNOSIS — Z00.00 ANNUAL PHYSICAL EXAM: ICD-10-CM

## 2025-03-03 PROCEDURE — 99396 PREV VISIT EST AGE 40-64: CPT | Performed by: INTERNAL MEDICINE

## 2025-03-03 PROCEDURE — 99214 OFFICE O/P EST MOD 30 MIN: CPT | Performed by: INTERNAL MEDICINE

## 2025-03-03 RX ORDER — PRAVASTATIN SODIUM 20 MG
20 TABLET ORAL
Qty: 100 TABLET | Refills: 3 | Status: SHIPPED | OUTPATIENT
Start: 2025-03-03

## 2025-03-03 RX ORDER — METFORMIN HYDROCHLORIDE 750 MG/1
750 TABLET, EXTENDED RELEASE ORAL 2 TIMES DAILY
Qty: 200 TABLET | Refills: 3 | Status: SHIPPED | OUTPATIENT
Start: 2025-03-03

## 2025-03-03 RX ORDER — VALSARTAN 40 MG/1
40 TABLET ORAL EVERY EVENING
Qty: 100 TABLET | Refills: 3 | Status: SHIPPED | OUTPATIENT
Start: 2025-03-03

## 2025-03-03 NOTE — ASSESSMENT & PLAN NOTE
Patient has hyperlipidemia which has gotten worse compared to last year.  Discussed diet and increasing physical activity  His father had quadruple bypass in his 60s.  I am starting patient on present milligrams daily  Orders:  •  pravastatin (PRAVACHOL) 20 mg tablet; Take 1 tablet (20 mg total) by mouth daily at bedtime

## 2025-03-03 NOTE — PATIENT INSTRUCTIONS
"Patient Education     Diabetes and diet   The Basics   Written by the doctors and editors at [facility]   Why is diet important if I have diabetes? -- Diet is important because it is part of diabetes treatment. Many people need to change what they eat and how much they eat to help treat their diabetes. It is important for people to treat their diabetes so they:   Keep their blood sugar at goal   Prevent long-term problems, such as heart or kidney problems, that can happen in people with diabetes  Changing your diet can also help treat obesity, high blood pressure, and high cholesterol. These conditions can affect people with diabetes and can lead to future problems, such as heart attacks or strokes.  Who will help me change my diet? -- Your doctor or nurse will work with you to make a food plan to change your diet. They might also recommend that you work with a dietitian. A dietitian is an expert on food and eating.  Do I need to eat at the same times every day? -- When and how often you should eat depends, in part, on the diabetes medicines you take. For example:   People who take about the same amount of insulin at the same time each day (called a \"fixed regimen\") should eat meals at the same times. This is also true for people who take pills that increase insulin levels, such as sulfonylureas. Eating meals at the same time every day helps prevent low blood sugar.   People who adjust the dose and timing of their insulin each day (called a \"flexible regimen\") do not always have to eat meals at the same time. That's because they can time their insulin dose for before they plan to eat, and also adjust the dose for how much they plan to eat.   People who take medicines that don't usually cause low blood sugar, such as metformin, don't have to eat meals at the same time every day.  What do I need to think about when planning what to eat? -- Our bodies break down the food we eat into small pieces called carbohydrates, " "proteins, and fats.  When planning what to eat, people with diabetes need to think about:   Carbohydrates (or \"carbs\") - These are sugars the body uses for energy. They can raise your blood sugar level. Your doctor, nurse, or dietitian will tell you how many carbs you should eat at each meal or snack. Foods that have carbs include:   Bread, pasta, and rice   Vegetables and fruits   Dairy foods   Foods and drinks with added sugar  It is best to get your carbs from fruits, vegetables, whole grains, and low-fat milk. It is best to avoid drinks with added sugar, like soda, juices, and sports drinks.   Protein - Your doctor, nurse, or dietitian will tell you how much protein you should eat each day. It is best to eat lean meats, fish, eggs, beans, peas, soy products, nuts, and seeds. Avoid or limit processed meats like pretty, hot dogs, and sausages.   Fats - The type of fat you eat is more important than the amount of fat. \"Saturated\" and \"trans\" fats can increase your risk for heart problems, like a heart attack.   Foods that have saturated fats include meat, butter, cheese, and ice cream.   Foods that have trans fats include processed food with \"partially hydrogenated oils\" on the ingredient list. This might include fried foods, store-bought cookies, muffins, pies, and cakes.  \"Monounsaturated\" and \"polyunsaturated\" fats are better for you. Foods with these types of fat include fish, avocado, olive oil, and nuts.   Calories - You need to eat a certain amount of calories each day to keep your weight the same. If you have excess body weight and want to lose weight, you need to eat fewer calories each day.   Fiber - Eating foods with a lot of fiber can help manage your blood sugar level. Foods that have a lot of fiber include apples, green beans, peas, beans, lentils, nuts, oatmeal, and whole grains.   Salt - People who have high blood pressure should not eat foods that contain a lot of salt (also called sodium). People " with high blood pressure should also eat healthy foods, such as fruits, vegetables, and low-fat dairy foods.   Alcohol and sugary drinks - Having more than 1 alcoholic drink (for females) or 2 drinks (for males) a day can raise blood sugar levels. Also, sugary drinks like fruit juice or soda can raise blood sugar levels.  How can I lose weight? -- You can:   Exercise - Try to get at least 30 minutes of physical activity a day, most days of the week. Even gentle exercise, like walking, is good for your health. Some people with diabetes need to change their medicine dose before they exercise. They might also need to check their blood sugar levels before and after exercising.   Eat fewer calories - Your doctor, nurse, or dietitian can tell you how many calories you should eat each day to lose weight.  If you are worried about your weight, size, or shape, talk with your doctor, nurse, or dietitian. They can help you make changes to improve your health.  Can I eat the same foods as my family? -- Yes. You do not need to eat special foods if you have diabetes. You and your family can eat the same foods. Changing your diet is mostly about eating healthy foods in healthy amounts.  What are the other parts of diabetes treatment? -- Besides changing your diet, the other parts of diabetes treatment are:   Exercise   Medicines  Some people with diabetes need to learn how to match their diet and exercise with their medicine dose. For example, people who use insulin might need to choose the dose of insulin they give themselves. To choose their dose, they need to think about:   What they plan to eat at the next meal   How much exercise they plan to do   What their blood sugar level is  If the diet and exercise do not match the medicine dose, a person's blood sugar level can get too low or too high. Blood sugar levels that are too low or too high can cause problems.  All topics are updated as new evidence becomes available and our  peer review process is complete.  This topic retrieved from [employer] on: Mar 27, 2024.  Topic 73368 Version 13.0  Release: 32.2.4 - C32.85  © 2024 UpToDate, Inc. and/or its affiliates. All rights reserved.  Consumer Information Use and Disclaimer   Disclaimer: This generalized information is a limited summary of diagnosis, treatment, and/or medication information. It is not meant to be comprehensive and should be used as a tool to help the user understand and/or assess potential diagnostic and treatment options. It does NOT include all information about conditions, treatments, medications, side effects, or risks that may apply to a specific patient. It is not intended to be medical advice or a substitute for the medical advice, diagnosis, or treatment of a health care provider based on the health care provider's examination and assessment of a patient's specific and unique circumstances. Patients must speak with a health care provider for complete information about their health, medical questions, and treatment options, including any risks or benefits regarding use of medications. This information does not endorse any treatments or medications as safe, effective, or approved for treating a specific patient. UpToDate, Inc. and its affiliates disclaim any warranty or liability relating to this information or the use thereof.The use of this information is governed by the Terms of Use, available at https://www.[employer] Technologiesuwer.com/en/know/clinical-effectiveness-terms. 2024© UpToDate, Inc. and its affiliates and/or licensors. All rights reserved.  Copyright   © 2024 UpToDate, Inc. and/or its affiliates. All rights reserved.

## 2025-03-03 NOTE — ASSESSMENT & PLAN NOTE
Lab Results   Component Value Date    HGBA1C 7.7 (H) 02/28/2025     Patient has type 2 diabetes with retinopathy.  He follows with ophthalmology regularly.  Will lower his blood sugars

## 2025-03-03 NOTE — PROGRESS NOTES
Adult Annual Physical  Name: Eddie Wall      : 1985      MRN: [id number]  Encounter Provider: Mckayla Miller MD  Encounter Date: 3/3/2025   Encounter department: [facility] [address]    Assessment & Plan  Type 2 diabetes mellitus with diabetic microalbuminuria, without long-term current use of insulin ([facility])    Lab Results   Component Value Date    HGBA1C 7.7 (H) 2025   Patient has type 2 diabetes mellitus with microalbuminuria.  Reviewed labs from the  that show worsening diabetes, increasing microalbuminuria, normal renal function.    Discussed diabetic diet and gave patient information on it.  Patient will adhere to diabetic diet.  He denies side effects on metformin.  I increased the dose of metformin to 750 mg twice daily  Will recheck A1c in 3 months along with urine micro albumin  I am starting patient on valsartan 40 mg daily for renal protection and to lower blood pressure  Recheck his potassium and renal function in 2 weeks      Orders:  •  Hemoglobin A1c (w/out EAG) ([facility] ONLY); Future  •  Microalbumin, Random Urine (W/Creatinine) ([facility] ONLY); Future  •  Comprehensive metabolic panel; Future  •  Lipid Panel with Direct LDL reflex; Future  •  metFORMIN (GLUCOPHAGE-XR) 750 mg 24 hr tablet; Take 1 tablet (750 mg total) by mouth 2 (two) times a day  •  valsartan (DIOVAN) 40 mg tablet; Take 1 tablet (40 mg total) by mouth every evening  •  Basic metabolic panel; Future    Pure hypercholesterolemia  Patient has hyperlipidemia which has gotten worse compared to last year.  Discussed diet and increasing physical activity  His father had quadruple bypass in his 60s.  I am starting patient on present milligrams daily  Orders:  •  pravastatin (PRAVACHOL) 20 mg tablet; Take 1 tablet (20 mg total) by mouth daily at bedtime    Type 2 diabetes mellitus with both eyes affected by mild nonproliferative retinopathy without macular edema, without long-term current use of  insulin ([facility])    Lab Results   Component Value Date    HGBA1C 7.7 (H) 02/28/2025     Patient has type 2 diabetes with retinopathy.  He follows with ophthalmology regularly.  Will lower his blood sugars       Sarcoidosis of lung ([facility])  He has history of sarcoidosis of the lung.  He is asymptomatic denying fevers, cough, shortness of breath, wheezing.  Denies rash, joint swelling, eye pain.  Lungs were completely to auscultation  Will monitor patient       Annual physical exam           Immunizations and preventive care screenings were discussed with patient today. Appropriate education was printed on patient's after visit summary.        Counseling:  Exercise: the importance of regular exercise/physical activity was discussed. Recommend exercise 3-5 times per week for at least 30 minutes.   Discussed diet  Up-to-date on glucose and lipid measurments      Depression Screening and Follow-up Plan: Patient was screened for depression during today's encounter. They screened negative with a PHQ-2 score of 0.          History of Present Illness     Adult Annual Physical:  Patient presents for annual physical.     Diet and Physical Activity:  - Diet/Nutrition:. pt will start diabetic diet  - Exercise: no formal exercise.    Depression Screening:  - PHQ-2 Score: 0    General Health:    - Hearing: normal hearing bilateral ears.  - Vision: goes for regular eye exams.  - Dental: regular dental visits.     Health:    - Urinary symptoms: none.     Review of Systems   Constitutional:  Negative for fever.   HENT:  Negative for hearing loss.    Eyes:  Negative for visual disturbance.   Respiratory:  Negative for cough and shortness of breath.    Cardiovascular:  Negative for chest pain and palpitations.   Gastrointestinal:  Negative for abdominal pain, blood in stool, constipation and diarrhea.   Endocrine: Negative for polydipsia and polyphagia.   Genitourinary:  Negative for difficulty urinating and hematuria.   Musculoskeletal:   "Negative for myalgias.   Skin:  Negative for rash.   Neurological:  Negative for weakness and numbness.   Psychiatric/Behavioral:  Negative for dysphoric mood.    All other systems reviewed and are negative.        Objective   /86   Pulse 70   Temp (!) 97 °F (36.1 °C) (Tympanic)   Resp 16   Ht 6' 1.5\" (1.867 m)   Wt 106 kg (234 lb)   SpO2 97%   BMI 30.45 kg/m²     Physical Exam  Constitutional:       General: He is not in acute distress.     Appearance: He is well-developed. He is not toxic-appearing.   HENT:      Head: Normocephalic.      Right Ear: Tympanic membrane normal.      Left Ear: Tympanic membrane normal.      Mouth/Throat:      Mouth: Mucous membranes are moist.      Pharynx: No oropharyngeal exudate.   Eyes:      Conjunctiva/sclera: Conjunctivae normal.   Cardiovascular:      Rate and Rhythm: Normal rate and regular rhythm.      Pulses: no weak pulses.           Dorsalis pedis pulses are 2+ on the right side and 2+ on the left side.        Posterior tibial pulses are 2+ on the right side and 2+ on the left side.      Heart sounds: No murmur heard.  Pulmonary:      Effort: No respiratory distress.      Breath sounds: No wheezing or rales.   Abdominal:      General: Bowel sounds are normal.      Palpations: Abdomen is soft.      Tenderness: There is no abdominal tenderness.   Musculoskeletal:      Cervical back: Neck supple.   Feet:      Right foot:      Skin integrity: No ulcer.      Left foot:      Skin integrity: No ulcer.   Skin:     General: Skin is warm and dry.   Neurological:      Mental Status: He is alert and oriented to person, place, and time.      Cranial Nerves: No cranial nerve deficit.      Motor: No weakness.      Gait: Gait normal.   Psychiatric:         Mood and Affect: Mood normal.         Thought Content: Thought content normal.     Diabetic Foot Exam    Patient's shoes and socks removed.    Right Foot/Ankle   Right Foot Inspection  Skin Exam: No ulcer.     Toe Exam: " Erythema    Sensory   Monofilament testing: intact    Vascular  The right DP pulse is 2+. The right PT pulse is 2+.     Left Foot/Ankle  Left Foot Inspection  Skin Exam: No ulcer.     Toe Exam: No erythema.     Sensory   Monofilament testing: intact    Vascular  The left DP pulse is 2+. The left PT pulse is 2+.     Assign Risk Category  No deformity present  No loss of protective sensation  No weak pulses  Risk: 0

## 2025-03-03 NOTE — ASSESSMENT & PLAN NOTE
He has history of sarcoidosis of the lung.  He is asymptomatic denying fevers, cough, shortness of breath, wheezing.  Denies rash, joint swelling, eye pain.  Lungs were completely to auscultation  Will monitor patient

## 2025-03-03 NOTE — ASSESSMENT & PLAN NOTE
Lab Results   Component Value Date    HGBA1C 7.7 (H) 02/28/2025   Patient has type 2 diabetes mellitus with microalbuminuria.  Reviewed labs from the 28th that show worsening diabetes, increasing microalbuminuria, normal renal function.    Discussed diabetic diet and gave patient information on it.  Patient will adhere to diabetic diet.  He denies side effects on metformin.  I increased the dose of metformin to 750 mg twice daily  Will recheck A1c in 3 months along with urine micro albumin  I am starting patient on valsartan 40 mg daily for renal protection and to lower blood pressure  Recheck his potassium and renal function in 2 weeks      Orders:  •  Hemoglobin A1c (w/out EAG) ([facility] ONLY); Future  •  Microalbumin, Random Urine (W/Creatinine) ([facility] ONLY); Future  •  Comprehensive metabolic panel; Future  •  Lipid Panel with Direct LDL reflex; Future  •  metFORMIN (GLUCOPHAGE-XR) 750 mg 24 hr tablet; Take 1 tablet (750 mg total) by mouth 2 (two) times a day  •  valsartan (DIOVAN) 40 mg tablet; Take 1 tablet (40 mg total) by mouth every evening  •  Basic metabolic panel; Future

## 2025-06-03 ENCOUNTER — RA CDI HCC (OUTPATIENT)
Dept: OTHER | Facility: HOSPITAL | Age: 40
End: 2025-06-03

## 2025-06-03 NOTE — PROGRESS NOTES
HCC coding opportunities          Chart Reviewed number of suggestions sent to Provider: 1  E11.65     Patients Insurance        Commercial Insurance: [employer]

## 2025-06-07 LAB
ALBUMIN SERPL-MCNC: 5 G/DL (ref 3.6–5.1)
ALBUMIN/CREAT UR: 26 MG/G CREAT
ALBUMIN/GLOB SERPL: 2.3 (CALC) (ref 1–2.5)
ALP SERPL-CCNC: 59 U/L (ref 36–130)
ALT SERPL-CCNC: 53 U/L (ref 9–46)
AST SERPL-CCNC: 25 U/L (ref 10–40)
BILIRUB SERPL-MCNC: 1 MG/DL (ref 0.2–1.2)
BUN SERPL-MCNC: 19 MG/DL (ref 7–25)
BUN/CREAT SERPL: ABNORMAL (CALC) (ref 6–22)
CALCIUM SERPL-MCNC: 9.7 MG/DL (ref 8.6–10.3)
CHLORIDE SERPL-SCNC: 101 MMOL/L (ref 98–110)
CHOLEST SERPL-MCNC: 179 MG/DL
CHOLEST/HDLC SERPL: 3.8 (CALC)
CO2 SERPL-SCNC: 27 MMOL/L (ref 20–32)
CREAT SERPL-MCNC: 1 MG/DL (ref 0.6–1.29)
CREAT UR-MCNC: 127 MG/DL (ref 20–320)
GFR/BSA.PRED SERPLBLD CYS-BASED-ARV: 97 ML/MIN/1.73M2
GLOBULIN SER CALC-MCNC: 2.2 G/DL (CALC) (ref 1.9–3.7)
GLUCOSE SERPL-MCNC: 120 MG/DL (ref 65–99)
HBA1C MFR BLD: 7.6 %
HDLC SERPL-MCNC: 47 MG/DL
LDLC SERPL CALC-MCNC: 99 MG/DL (CALC)
MICROALBUMIN UR-MCNC: 3.3 MG/DL
NONHDLC SERPL-MCNC: 132 MG/DL (CALC)
POTASSIUM SERPL-SCNC: 4.3 MMOL/L (ref 3.5–5.3)
PROT SERPL-MCNC: 7.2 G/DL (ref 6.1–8.1)
SODIUM SERPL-SCNC: 139 MMOL/L (ref 135–146)
TRIGL SERPL-MCNC: 210 MG/DL

## 2025-06-08 ENCOUNTER — RESULTS FOLLOW-UP (OUTPATIENT)
Dept: FAMILY MEDICINE CLINIC | Facility: HOSPITAL | Age: 40
End: 2025-06-08

## 2025-06-10 ENCOUNTER — OFFICE VISIT (OUTPATIENT)
Dept: FAMILY MEDICINE CLINIC | Facility: HOSPITAL | Age: 40
End: 2025-06-10
Payer: COMMERCIAL

## 2025-06-10 VITALS
SYSTOLIC BLOOD PRESSURE: 136 MMHG | DIASTOLIC BLOOD PRESSURE: 82 MMHG | WEIGHT: 233 LBS | HEART RATE: 66 BPM | BODY MASS INDEX: 29.9 KG/M2 | HEIGHT: 74 IN | RESPIRATION RATE: 16 BRPM | TEMPERATURE: 97.5 F | OXYGEN SATURATION: 95 %

## 2025-06-10 DIAGNOSIS — R80.9 TYPE 2 DIABETES MELLITUS WITH DIABETIC MICROALBUMINURIA, WITHOUT LONG-TERM CURRENT USE OF INSULIN (HCC): Primary | ICD-10-CM

## 2025-06-10 DIAGNOSIS — E11.3293 TYPE 2 DIABETES MELLITUS WITH BOTH EYES AFFECTED BY MILD NONPROLIFERATIVE RETINOPATHY WITHOUT MACULAR EDEMA, WITHOUT LONG-TERM CURRENT USE OF INSULIN (HCC): ICD-10-CM

## 2025-06-10 DIAGNOSIS — E11.29 TYPE 2 DIABETES MELLITUS WITH DIABETIC MICROALBUMINURIA, WITHOUT LONG-TERM CURRENT USE OF INSULIN (HCC): Primary | ICD-10-CM

## 2025-06-10 PROCEDURE — 99213 OFFICE O/P EST LOW 20 MIN: CPT | Performed by: INTERNAL MEDICINE

## 2025-06-10 NOTE — ASSESSMENT & PLAN NOTE
Lab Results   Component Value Date    HGBA1C 7.6 (H) 06/06/2025   Patient has a type 2 diabetes with microalbuminuria and retinopathy  A1c decreased to 7.6 but is still increased.  Urine microalbumin showed within the normal range at this time  Patient's ophthalmology last year    We discussed diet and I told patient that it was okay to practice intermittent fasting as long as he eats when he feels that his blood sugar is lower.    We Discussed adding Ozempic/Mounjaro or Jardiance/Invokana/Farxiga to metformin    We discussed side effects and contraindications to medications    Patient wishes to discuss adding a second medication to metformin with his wife.  He will get back to me    His blood pressure was mildly increased.  I asked him to check his blood pressure at home and send me his blood pressure readings in 2-3 weeks    I will recheck hemoglobin A1c and urine micro before next visit in 3 months    Orders:  •  Hemoglobin A1c (w/out EAG) ([facility] ONLY); Future  •  Microalbumin, Random Urine (W/Creatinine) ([facility] ONLY); Future  •  Comprehensive metabolic panel; Future

## 2025-06-10 NOTE — PROGRESS NOTES
"Name: Eddie Wall      : 1985      MRN: [id number]  Encounter Provider: Mckayla Miller MD  Encounter Date: 6/10/2025   Encounter department: [facility] [address]  :  Assessment & Plan  Type 2 diabetes mellitus with diabetic microalbuminuria, without long-term current use of insulin (HCC)    Lab Results   Component Value Date    HGBA1C 7.6 (H) 2025   Patient has a type 2 diabetes with microalbuminuria and retinopathy  A1c decreased to 7.6 but is still increased.  Urine microalbumin showed within the normal range at this time  Patient's ophthalmology last year    We discussed diet and I told patient that it was okay to practice intermittent fasting as long as he eats when he feels that his blood sugar is lower.    We Discussed adding Ozempic/Mounjaro or Jardiance/Invokana/Farxiga to metformin    We discussed side effects and contraindications to medications    Patient wishes to discuss adding a second medication to metformin with his wife.  He will get back to me    His blood pressure was mildly increased.  I asked him to check his blood pressure at home and send me his blood pressure readings in 2-3 weeks    I will recheck hemoglobin A1c and urine micro before next visit in 3 months    Orders:  •  Hemoglobin A1c (w/out EAG) ([facility] ONLY); Future  •  Microalbumin, Random Urine (W/Creatinine) ([facility] ONLY); Future  •  Comprehensive metabolic panel; Future    Type 2 diabetes mellitus with both eyes affected by mild nonproliferative retinopathy without macular edema, without long-term current use of insulin (HCC)    Lab Results   Component Value Date    HGBA1C 7.6 (H) 2025     Reviewed ophthalmology reports              History of Present Illness   HPI  Review of Systems    Objective   /82   Pulse 66   Temp 97.5 °F (36.4 °C) (Tympanic)   Resp 16   Ht 6' 1.5\" (1.867 m)   Wt 106 kg (233 lb)   SpO2 95%   BMI 30.32 kg/m²      Physical Exam  Constitutional:       " General: He is not in acute distress.     Appearance: He is not toxic-appearing.     Cardiovascular:      Rate and Rhythm: Normal rate and regular rhythm.      Heart sounds: No murmur heard.  Pulmonary:      Effort: No respiratory distress.      Breath sounds: No wheezing or rales.     Neurological:      Mental Status: He is alert.

## 2025-06-10 NOTE — PATIENT INSTRUCTIONS
The medications I would like to add to metformin are jardiance/farxiga/invokana OR ozempic/mounjaro. Ozempic and mounjaro are injections once a week and are associated with more weight loss then the other call of medications.    Take your blood pressure once a day at various times of the day after resting in a sitting position for 5-10 minutes. Keep a log of your blood pressure and heart rate readings and send me the recordings in about 2-3 weeks!    Your goal blood pressure is <130/80

## 2025-06-10 NOTE — ASSESSMENT & PLAN NOTE
Lab Results   Component Value Date    HGBA1C 7.6 (H) 06/06/2025     Reviewed ophthalmology reports

## 2025-06-11 ENCOUNTER — OFFICE VISIT (OUTPATIENT)
Dept: ENDOCRINOLOGY | Facility: HOSPITAL | Age: 40
End: 2025-06-11
Payer: COMMERCIAL

## 2025-06-11 VITALS
OXYGEN SATURATION: 97 % | SYSTOLIC BLOOD PRESSURE: 118 MMHG | BODY MASS INDEX: 30.03 KG/M2 | HEIGHT: 74 IN | WEIGHT: 234 LBS | HEART RATE: 68 BPM | DIASTOLIC BLOOD PRESSURE: 88 MMHG

## 2025-06-11 DIAGNOSIS — E11.29 TYPE 2 DIABETES MELLITUS WITH DIABETIC MICROALBUMINURIA, WITHOUT LONG-TERM CURRENT USE OF INSULIN (HCC): ICD-10-CM

## 2025-06-11 DIAGNOSIS — E78.00 PURE HYPERCHOLESTEROLEMIA: ICD-10-CM

## 2025-06-11 DIAGNOSIS — R80.9 TYPE 2 DIABETES MELLITUS WITH DIABETIC MICROALBUMINURIA, WITHOUT LONG-TERM CURRENT USE OF INSULIN (HCC): ICD-10-CM

## 2025-06-11 DIAGNOSIS — E11.3293 TYPE 2 DIABETES MELLITUS WITH BOTH EYES AFFECTED BY MILD NONPROLIFERATIVE RETINOPATHY WITHOUT MACULAR EDEMA, WITHOUT LONG-TERM CURRENT USE OF INSULIN (HCC): Primary | ICD-10-CM

## 2025-06-11 PROCEDURE — 99204 OFFICE O/P NEW MOD 45 MIN: CPT | Performed by: STUDENT IN AN ORGANIZED HEALTH CARE EDUCATION/TRAINING PROGRAM

## 2025-06-11 RX ORDER — ACYCLOVIR 400 MG/1
1 TABLET ORAL
Qty: 10 EACH | Refills: 1 | Status: SHIPPED | OUTPATIENT
Start: 2025-06-11 | End: 2025-06-17

## 2025-06-11 NOTE — PROGRESS NOTES
Name: Eddie Wall      : 1985      MRN: [id number]  Encounter Provider: Mary Kay Joyner MD  Encounter Date: 2025   Encounter department: [facility]    Chief Complaint   Patient presents with    Diabetes Type 2   :  Assessment & Plan  Type 2 diabetes mellitus with both eyes affected by mild nonproliferative retinopathy without macular edema, without long-term current use of insulin (HCC)    Lab Results   Component Value Date    HGBA1C 7.6 (H) 2025            Type 2 diabetes mellitus with diabetic microalbuminuria, without long-term current use of insulin (HCC)    Lab Results   Component Value Date    HGBA1C 7.6 (H) 2025            Pure hypercholesterolemia         Plan:    Patient is a 40yM With PMHx of DM since With complications of proteinuria now resolved Other PMHx of hlp, class 1 obesity Who presents today for diabetic care.     1) T2DM:- Patients diabetes has been suboptimal for some time now. His A1C fluctuates in 7% range with recent A1C at 7.6%. despite this he has complications of retinopathy and proteinuria which is generally seen when A1C is >8-9% range. Discussed he does have RF for T2DM including hyperlipidemia, family history, hepatomegaly which can all be risk factors for T2DM but his early onset complications with lower A1C may either indicate a false A1C or possibly SANDY vs BIANCA type diabetes (specially BIANCA 5 given renal complications). D/t unsure what kind of diabetes he currently has I will order C peptide, JACINDA Ab and also fructoasmine to obtain more data. Will also recommend CGM wear to correlate BG and A1C/fruc and where his issue with BG lays before adjusting his medications. Close follow up in 6-8 weeks     Plan   - c/w metformin 750mg BID for now      Screening:-   Retinopathy- UTD  Nephropathy- ITD  Lipide levels- UTD    2) Hypertension:- BP in clinic 118/88, currently on valsartan 40mg daily     3)  "Hyperlipidemia:- LDL at goal, Tg was elevated at 210. Discussed low fat diet. Repeat labs in 3 months. C/w pravastatin 20mg daily     RTC in 3 months     Pertinent Medical History     History of Present Illness     Eddie Wall is a 40 y.o. male  History of Present Illness:   with a history of possibly type 2 diabetes With previous complication of proteinuria- now resolved with other Pmhx of hlp, class 1 obesity who presents today for diabetes management.     Patient was first diagnosed with T2DM- reports was prediabetic for some time and then progressed to diabetes 2 yrs ago.   Control since diagnosis: suboptimal with A1C in 7% range  Medications tried thus far: below  Current regime:- metformin 750mg BID  BG control:- Doesn't check d/t issues with callus  Hypoglycemic episodes:  None  Hyperglycemia symptoms: no polyuria or polydipsia\",\"no chest pain, dyspnea or TIAs\", some numbness, tingling or pain in extremities, does report some BV at times  Diet: reports diet hasn't been the best recently but does try. Has done IF in the past and helped lose weight  Activity: stays active, is build. Denies any steriods or hormone use. Works as a     Opthamology:10/24  no retinopathy, no macular edema. Did have retinopathy b/l in   Hx of hyperlipidemia: Yes  Hx of hypertension: Yes  Last Lipid:-  LDL 99,   Last urine microalbumin:  normal  Last hbA1C:  7.6% was 7,7% before.   Did have Hepatic steatosis and hepatomegaly seen on CT in 2017    Social Hx:- see above  Family HX:- father  from ESRD from DM complications in his 60's, had foot amp and so forth. Unsure age of Dx. Sister is 40's and has diabetes and is on insulin. Brother is in his 30's and has diabetes.     Review of Systems   Constitutional:  Negative for diaphoresis, fatigue and unexpected weight change.   Respiratory:  Negative for shortness of breath.    Cardiovascular:  Negative for chest pain and palpitations. "   Gastrointestinal:  Negative for constipation and diarrhea.   Endocrine: Negative for polydipsia and polyuria.    as per HPI       Medical History Reviewed by provider this encounter:     .    Objective   There were no vitals taken for this visit.     There is no height or weight on file to calculate BMI.  Wt Readings from Last 3 Encounters:   06/10/25 106 kg (233 lb)   03/03/25 106 kg (234 lb)   08/29/24 99.8 kg (220 lb)     Physical Exam  Vitals and nursing note reviewed.   Constitutional:       General: He is not in acute distress.     Appearance: Normal appearance. He is well-developed. He is obese.   HENT:      Head: Normocephalic and atraumatic.     Eyes:      Conjunctiva/sclera: Conjunctivae normal.       Cardiovascular:      Rate and Rhythm: Normal rate and regular rhythm.      Heart sounds: No murmur heard.  Pulmonary:      Effort: Pulmonary effort is normal. No respiratory distress.      Breath sounds: Normal breath sounds.   Abdominal:      Palpations: Abdomen is soft.      Tenderness: There is no abdominal tenderness.     Musculoskeletal:         General: No swelling.      Cervical back: Neck supple.     Skin:     General: Skin is warm and dry.      Capillary Refill: Capillary refill takes less than 2 seconds.     Neurological:      Mental Status: He is alert.     Psychiatric:         Mood and Affect: Mood normal.       Labs: I have reviewed pertinent labs including:   Lab Results   Component Value Date    HGBA1C 7.6 (H) 06/06/2025    HGBA1C 7.7 (H) 02/28/2025    HGBA1C 6.7 (H) 08/27/2024      Lab Results   Component Value Date    CREATININE 1.00 06/06/2025    CREATININE 1.01 02/28/2025    CREATININE 0.91 08/27/2024    BUN 19 06/06/2025    K 4.3 06/06/2025     06/06/2025    CO2 27 06/06/2025      eGFR   Date Value Ref Range Status   06/06/2025 97 > OR = 60 mL/min/1.73m2 Final   09/02/2017 85 ml/min/1.73sq m Final      Cholesterol   Date Value Ref Range Status   10/15/2015 152 100 - 199 mg/dL Final      Comment:     Result Comment: **Effective October 26, 2015 the reference interval**                   for Cholesterol, Total will be changing to:                                          0 - 19 years       100 - 169                                             >19 years       100 - 199       HDL   Date Value Ref Range Status   06/06/2025 47 > OR = 40 mg/dL Final     Triglycerides   Date Value Ref Range Status   06/06/2025 210 (H) <150 mg/dL Final     Comment:        If a non-fasting specimen was collected, consider  repeat triglyceride testing on a fasting specimen  if clinically indicated.   Emily et al. J. of Clin. Lipidol. 2015;9:129-169.           ALT   Date Value Ref Range Status   06/06/2025 53 (H) 9 - 46 U/L Final     AST   Date Value Ref Range Status   06/06/2025 25 10 - 40 U/L Final     Alkaline Phosphatase   Date Value Ref Range Status   06/06/2025 59 36 - 130 U/L Final      Radiology Results Review : No pertinent imaging studies reviewed.  There are no Patient Instructions on file for this visit.    Discussed with the patient and all questioned fully answered. He will call me if any problems arise.

## 2025-06-13 ENCOUNTER — TELEPHONE (OUTPATIENT)
Dept: ENDOCRINOLOGY | Facility: HOSPITAL | Age: 40
End: 2025-06-13

## 2025-06-13 NOTE — TELEPHONE ENCOUNTER
PA for Dexcom G7 Sensor DENIED    Reason:        Message sent to office clinical pool Yes    Denial letter scanned into Media Yes    We can gladly do an appeal but the process can take about 30-60 days to provide determination. Please have the office staff schedule a Peer to Peer at phone  . If an appeal is truly warranted please have Provider send clinical documentation to the PA department to support the appeal.     **Please follow up with your patient regarding denial and next steps**

## 2025-06-13 NOTE — TELEPHONE ENCOUNTER
PA for Dexcom G7 Sensor SUBMITTED to [employer]    via    [x]CMM-KEY: [id number]  []Surescripts-Case ID #   []Availity-Auth ID # NDC #   []Faxed to plan   []Other website   []Phone call Case ID #     [x]PA sent as URGENT    All office notes, labs and other pertaining documents and studies sent. Clinical questions answered. Awaiting determination from insurance company.     Turnaround time for your insurance to make a decision on your Prior Authorization can take 7-21 business days.

## 2025-06-14 DIAGNOSIS — E11.3293 TYPE 2 DIABETES MELLITUS WITH BOTH EYES AFFECTED BY MILD NONPROLIFERATIVE RETINOPATHY WITHOUT MACULAR EDEMA, WITHOUT LONG-TERM CURRENT USE OF INSULIN (HCC): ICD-10-CM

## 2025-06-14 DIAGNOSIS — R80.9 TYPE 2 DIABETES MELLITUS WITH DIABETIC MICROALBUMINURIA, WITHOUT LONG-TERM CURRENT USE OF INSULIN (HCC): ICD-10-CM

## 2025-06-14 DIAGNOSIS — E11.29 TYPE 2 DIABETES MELLITUS WITH DIABETIC MICROALBUMINURIA, WITHOUT LONG-TERM CURRENT USE OF INSULIN (HCC): ICD-10-CM

## 2025-06-17 RX ORDER — ACYCLOVIR 400 MG/1
TABLET ORAL
Qty: 10 EACH | Refills: 1 | Status: SHIPPED | OUTPATIENT
Start: 2025-06-17

## 2025-07-03 LAB
C PEPTIDE SERPL-MCNC: 1.6 NG/ML (ref 0.8–3.85)
FRUCTOSAMINE SERPL-SCNC: 276 UMOL/L (ref 205–285)
GAD65 AB SER IA-ACNC: <5 IU/ML
INSULIN AB SER RIA-ACNC: <0.4 U/ML
ISLET CELL512 AB SER IA-ACNC: <5.4 U/ML

## 2025-08-07 ENCOUNTER — OFFICE VISIT (OUTPATIENT)
Dept: ENDOCRINOLOGY | Facility: HOSPITAL | Age: 40
End: 2025-08-07
Payer: COMMERCIAL

## 2025-08-07 VITALS
HEIGHT: 74 IN | WEIGHT: 231 LBS | SYSTOLIC BLOOD PRESSURE: 122 MMHG | DIASTOLIC BLOOD PRESSURE: 78 MMHG | BODY MASS INDEX: 29.65 KG/M2 | HEART RATE: 58 BPM

## 2025-08-07 DIAGNOSIS — R80.9 TYPE 2 DIABETES MELLITUS WITH DIABETIC MICROALBUMINURIA, WITHOUT LONG-TERM CURRENT USE OF INSULIN (HCC): ICD-10-CM

## 2025-08-07 DIAGNOSIS — E11.3293 TYPE 2 DIABETES MELLITUS WITH BOTH EYES AFFECTED BY MILD NONPROLIFERATIVE RETINOPATHY WITHOUT MACULAR EDEMA, WITHOUT LONG-TERM CURRENT USE OF INSULIN (HCC): Primary | ICD-10-CM

## 2025-08-07 DIAGNOSIS — E11.29 TYPE 2 DIABETES MELLITUS WITH DIABETIC MICROALBUMINURIA, WITHOUT LONG-TERM CURRENT USE OF INSULIN (HCC): ICD-10-CM

## 2025-08-07 DIAGNOSIS — E78.00 PURE HYPERCHOLESTEROLEMIA: ICD-10-CM

## 2025-08-07 PROCEDURE — 95251 CONT GLUC MNTR ANALYSIS I&R: CPT | Performed by: STUDENT IN AN ORGANIZED HEALTH CARE EDUCATION/TRAINING PROGRAM

## 2025-08-07 PROCEDURE — 99214 OFFICE O/P EST MOD 30 MIN: CPT | Performed by: STUDENT IN AN ORGANIZED HEALTH CARE EDUCATION/TRAINING PROGRAM
